# Patient Record
Sex: MALE | Race: WHITE | NOT HISPANIC OR LATINO | Employment: STUDENT | URBAN - NONMETROPOLITAN AREA
[De-identification: names, ages, dates, MRNs, and addresses within clinical notes are randomized per-mention and may not be internally consistent; named-entity substitution may affect disease eponyms.]

---

## 2019-07-18 ENCOUNTER — NON-PROVIDER VISIT (OUTPATIENT)
Dept: URGENT CARE | Facility: PHYSICIAN GROUP | Age: 19
End: 2019-07-18

## 2019-07-18 DIAGNOSIS — Z02.1 PRE-EMPLOYMENT DRUG SCREENING: ICD-10-CM

## 2019-07-18 PROCEDURE — 80305 DRUG TEST PRSMV DIR OPT OBS: CPT | Performed by: PHYSICIAN ASSISTANT

## 2019-07-27 ENCOUNTER — HOSPITAL ENCOUNTER (OUTPATIENT)
Facility: MEDICAL CENTER | Age: 19
End: 2019-07-27
Attending: PHYSICIAN ASSISTANT

## 2019-07-27 ENCOUNTER — OFFICE VISIT (OUTPATIENT)
Dept: URGENT CARE | Facility: PHYSICIAN GROUP | Age: 19
End: 2019-07-27

## 2019-07-27 VITALS
HEART RATE: 60 BPM | DIASTOLIC BLOOD PRESSURE: 66 MMHG | OXYGEN SATURATION: 96 % | WEIGHT: 168 LBS | HEIGHT: 71 IN | TEMPERATURE: 98.1 F | BODY MASS INDEX: 23.52 KG/M2 | RESPIRATION RATE: 16 BRPM | SYSTOLIC BLOOD PRESSURE: 114 MMHG

## 2019-07-27 DIAGNOSIS — Z20.2 POSSIBLE EXPOSURE TO STD: ICD-10-CM

## 2019-07-27 PROCEDURE — 87591 N.GONORRHOEAE DNA AMP PROB: CPT

## 2019-07-27 PROCEDURE — 99204 OFFICE O/P NEW MOD 45 MIN: CPT | Mod: 25 | Performed by: PHYSICIAN ASSISTANT

## 2019-07-27 PROCEDURE — 87491 CHLMYD TRACH DNA AMP PROBE: CPT

## 2019-07-27 RX ORDER — AZITHROMYCIN 250 MG/1
1000 TABLET, FILM COATED ORAL ONCE
Qty: 4 TAB | Refills: 0 | Status: SHIPPED | OUTPATIENT
Start: 2019-07-27 | End: 2019-07-27

## 2019-07-27 NOTE — PROGRESS NOTES
Chief Complaint   Patient presents with   • Exposure to STD       HISTORY OF PRESENT ILLNESS: Patient is a 19 y.o. male who presents today because he has had yellow discharge and burning with urination for the last month after having unprotected intercourse with someone that he knows to have intercourse with other people.  No fevers, chills, nausea, vomiting or diarrhea.  He has not been taking any medications for his current symptoms    There are no active problems to display for this patient.      Allergies:Patient has no known allergies.    Current Outpatient Prescriptions Ordered in [facility]   Medication Sig Dispense Refill   • azithromycin (ZITHROMAX) 250 MG Tab Take 4 Tabs by mouth Once for 1 dose. 4 Tab 0   • ciprofloxacin (CILOXIN) 0.3 % SOLN Place 1 Drop in both eyes every 4 hours. 1 Bottle 0     Current Facility-Administered Medications Ordered in Epic   Medication Dose Route Frequency Provider Last Rate Last Dose   • cefTRIAXone (ROCEPHIN) 1 g, lidocaine (XYLOCAINE) 1 % 3.6 mL for IM use  1 g Intramuscular Once Michael Lazcano, P.A.-C.           No past medical history on file.    Social History   Substance Use Topics   • Smoking status: Never Smoker   • Smokeless tobacco: Never Used   • Alcohol use No       No family status information on file.   No family history on file.    ROS:  Review of Systems   Constitutional: Negative for fever, chills, weight loss and malaise/fatigue.   HENT: Negative for ear pain, nosebleeds, congestion, sore throat and neck pain.    Eyes: Negative for blurred vision.   Respiratory: Negative for cough, sputum production, shortness of breath and wheezing.    Cardiovascular: Negative for chest pain, palpitations, orthopnea and leg swelling.   Gastrointestinal: Negative for heartburn, nausea, vomiting and abdominal pain.   Genitourinary: Positive for dysuria, urgency and frequency.     Exam:  /66 (BP Location: Left arm, Patient Position: Sitting, BP Cuff Size: Adult)   Pulse 60  "  Temp 36.7 °C (98.1 °F) (Temporal)   Resp 16   Ht 1.803 m (5' 11\")   Wt 76.2 kg (168 lb)   SpO2 96%   General:  Well nourished, well developed male in NAD  Head:Normocephalic, atraumatic  Eyes: PERRLA, EOM within normal limits, no conjunctival injection, no scleral icterus, visual fields and acuity grossly intact.  Nose: Symmetrical without tenderness, no discharge.  Mouth: reasonable hygiene, no erythema exudates or tonsillar enlargement.  Neck: no masses, range of motion within normal limits, no tracheal deviation. No obvious thyroid enlargement.  Extremities: no clubbing, cyanosis, or edema.    Please note that this dictation was created using voice recognition software. I have made every reasonable attempt to correct obvious errors, but I expect that there are errors of grammar and possibly content that I did not discover before finalizing the note.    Assessment/Plan:  1. Possible exposure to STD  CHLAMYDIA/GC PCR URINE OR SWAB    azithromycin (ZITHROMAX) 250 MG Tab    cefTRIAXone (ROCEPHIN) 1 g, lidocaine (XYLOCAINE) 1 % 3.6 mL for IM use       Followup with primary care in the next 7-10 days if not significantly improving, return to the urgent care or go to the emergency room sooner for any worsening of symptoms.       "

## 2019-07-28 LAB
C TRACH DNA SPEC QL NAA+PROBE: NEGATIVE
N GONORRHOEA DNA SPEC QL NAA+PROBE: NEGATIVE
SPECIMEN SOURCE: NORMAL

## 2025-02-22 ENCOUNTER — OFFICE VISIT (OUTPATIENT)
Dept: URGENT CARE | Facility: CLINIC | Age: 25
End: 2025-02-22
Payer: COMMERCIAL

## 2025-02-22 VITALS
TEMPERATURE: 97.3 F | WEIGHT: 196.87 LBS | HEIGHT: 72 IN | HEART RATE: 73 BPM | OXYGEN SATURATION: 96 % | DIASTOLIC BLOOD PRESSURE: 76 MMHG | BODY MASS INDEX: 26.67 KG/M2 | SYSTOLIC BLOOD PRESSURE: 122 MMHG | RESPIRATION RATE: 16 BRPM

## 2025-02-22 DIAGNOSIS — H10.9 CONJUNCTIVITIS OF LEFT EYE, UNSPECIFIED CONJUNCTIVITIS TYPE: ICD-10-CM

## 2025-02-22 PROCEDURE — 99203 OFFICE O/P NEW LOW 30 MIN: CPT | Performed by: STUDENT IN AN ORGANIZED HEALTH CARE EDUCATION/TRAINING PROGRAM

## 2025-02-22 RX ORDER — KETOROLAC TROMETHAMINE 5 MG/ML
1 SOLUTION OPHTHALMIC 4 TIMES DAILY PRN
Qty: 5 ML | Refills: 0 | Status: SHIPPED | OUTPATIENT
Start: 2025-02-22 | End: 2025-03-01

## 2025-02-22 RX ORDER — AZELASTINE HYDROCHLORIDE 0.5 MG/ML
1 SOLUTION/ DROPS OPHTHALMIC 2 TIMES DAILY
Qty: 6 ML | Refills: 0 | Status: SHIPPED | OUTPATIENT
Start: 2025-02-22 | End: 2025-03-01

## 2025-02-22 NOTE — LETTER
February 22, 2025    To Whom It May Concern:         This is confirmation that Blayne Be attended his scheduled appointment with Sergio Slade P.A.-C. on 2/22/25.  Excuse from work on 2/22/2025.         If you have any questions please do not hesitate to call me at the phone number listed below.    Sincerely,          Sergio Slade P.A.-C.  [phone]

## 2025-02-23 NOTE — PROGRESS NOTES
Subjective:   Blayne Be is a 24 y.o. male who presents for Eye Problem (X1day. L eye red, dry. No itchiness, impacting vision)      HPI:  24-year-old male presents to the urgent care for 24 hours of left eye redness, discomfort, dryness, and grittiness.  Does have some sensitivity to light as well.  No blurred vision or double vision.  No black spots within his vision or loss of vision.  Denies any traumatic injury or sensation of foreign body within the eye.  No fever, dizziness, or headache.    Medications:    azelastine  ciprofloxacin Soln  ketorolac Soln    Allergies: Patient has no known allergies.    Problem List: Blayne Be does not have a problem list on file.    Surgical History:  No past surgical history on file.    Past Social Hx: Blayne Be  reports that he has never smoked. He has never used smokeless tobacco. He reports current drug use. Drugs: Marijuana and Inhaled. He reports that he does not drink alcohol.     Past Family Hx:  Blayne Be family history is not on file.     Problem list, medications, and allergies reviewed by myself today in Epic.     Objective:     /76   Pulse 73   Temp 36.3 °C (97.3 °F)   Resp 16   Ht 1.829 m (6')   Wt 89.3 kg (196 lb 13.9 oz)   SpO2 96%   BMI 26.70 kg/m²     Physical Exam  Eyes:      General: Lids are everted, no foreign bodies appreciated.         Right eye: No foreign body, discharge or hordeolum.         Left eye: No foreign body, discharge or hordeolum.      Extraocular Movements: Extraocular movements intact.      Right eye: Normal extraocular motion.      Left eye: Normal extraocular motion.      Conjunctiva/sclera:      Right eye: Right conjunctiva is not injected. No chemosis.     Left eye: Left conjunctiva is injected. Chemosis present. No hemorrhage.        Assessment/Plan:     Diagnosis and associated orders:     1. Conjunctivitis of left eye, unspecified conjunctivitis type  azelastine (OPTIVAR) 0.05 % ophthalmic  solution    ketorolac (ACULAR) 0.5 % Solution         Comments/MDM:     Patient's presentation physical exam findings are consistent with conjunctivitis of the left eye.  Likely viral versus inflammatory.  I do not suspect any acute angle glaucoma.  Findings today not consistent with uveitis.  No rash to the face.  Presentation not consistent with preseptal orbital cellulitis.  Patient was started on azelastine eyedrops and ketorolac eyedrops.  Discussed reevaluation the emergency department if he starts having any pain with eye movements, change in vision, or loss of vision.         Differential diagnosis, natural history, supportive care, and indications for immediate follow-up discussed.    Advised the patient to follow-up with the primary care physician for recheck, reevaluation, and consideration of further management.    Please note that this dictation was created using voice recognition software. I have made a reasonable attempt to correct obvious errors, but I expect that there are errors of grammar and possibly content that I did not discover before finalizing the note.    Electronically signed by Sergio Slade PA-C.

## 2025-04-28 ENCOUNTER — HOSPITAL ENCOUNTER (OUTPATIENT)
Dept: LAB | Facility: MEDICAL CENTER | Age: 25
End: 2025-04-28
Payer: COMMERCIAL

## 2025-04-28 ENCOUNTER — OFFICE VISIT (OUTPATIENT)
Dept: URGENT CARE | Facility: CLINIC | Age: 25
End: 2025-04-28
Payer: COMMERCIAL

## 2025-04-28 ENCOUNTER — RESULTS FOLLOW-UP (OUTPATIENT)
Dept: URGENT CARE | Facility: CLINIC | Age: 25
End: 2025-04-28

## 2025-04-28 VITALS
RESPIRATION RATE: 12 BRPM | OXYGEN SATURATION: 97 % | DIASTOLIC BLOOD PRESSURE: 78 MMHG | TEMPERATURE: 98.1 F | HEART RATE: 76 BPM | BODY MASS INDEX: 25.06 KG/M2 | HEIGHT: 72 IN | SYSTOLIC BLOOD PRESSURE: 124 MMHG | WEIGHT: 185 LBS

## 2025-04-28 DIAGNOSIS — R30.0 DYSURIA: ICD-10-CM

## 2025-04-28 DIAGNOSIS — R30.0 DYSURIA: Primary | ICD-10-CM

## 2025-04-28 DIAGNOSIS — Z11.3 SCREENING EXAMINATION FOR STI: ICD-10-CM

## 2025-04-28 LAB
APPEARANCE UR: CLEAR
BILIRUB UR STRIP-MCNC: NEGATIVE MG/DL
COLOR UR AUTO: YELLOW
GLUCOSE UR STRIP.AUTO-MCNC: NEGATIVE MG/DL
HBV CORE AB SERPL QL IA: NONREACTIVE
HBV SURFACE AB SERPL IA-ACNC: 181 MIU/ML (ref 0–10)
HBV SURFACE AG SER QL: NORMAL
HCV AB SER QL: NORMAL
HIV 1+2 AB+HIV1 P24 AG SERPL QL IA: NORMAL
KETONES UR STRIP.AUTO-MCNC: NEGATIVE MG/DL
LEUKOCYTE ESTERASE UR QL STRIP.AUTO: NEGATIVE
NITRITE UR QL STRIP.AUTO: NEGATIVE
PH UR STRIP.AUTO: 5.5 [PH] (ref 5–8)
PROT UR QL STRIP: NEGATIVE MG/DL
RBC UR QL AUTO: NEGATIVE
SP GR UR STRIP.AUTO: 1.01
T PALLIDUM AB SER QL IA: NORMAL
UROBILINOGEN UR STRIP-MCNC: NORMAL MG/DL

## 2025-04-28 PROCEDURE — 86706 HEP B SURFACE ANTIBODY: CPT

## 2025-04-28 PROCEDURE — 86803 HEPATITIS C AB TEST: CPT

## 2025-04-28 PROCEDURE — 87340 HEPATITIS B SURFACE AG IA: CPT

## 2025-04-28 PROCEDURE — 36415 COLL VENOUS BLD VENIPUNCTURE: CPT

## 2025-04-28 PROCEDURE — 86780 TREPONEMA PALLIDUM: CPT

## 2025-04-28 PROCEDURE — 87389 HIV-1 AG W/HIV-1&-2 AB AG IA: CPT

## 2025-04-28 PROCEDURE — 86704 HEP B CORE ANTIBODY TOTAL: CPT

## 2025-04-28 ASSESSMENT — ENCOUNTER SYMPTOMS
WEAKNESS: 0
ABDOMINAL PAIN: 0
FEVER: 0
DIZZINESS: 0
NAUSEA: 0
FATIGUE: 1
SHORTNESS OF BREATH: 0
COUGH: 0
DIARRHEA: 0
CHILLS: 0
VOMITING: 0

## 2025-04-28 NOTE — PROGRESS NOTES
"Subjective     Blayne Be is a 24 y.o. male who presents with Sexually Transmitted Diseases (Testing, since yesterday, discharge, burning , rash.)            Blayne is here today wanting complete STI testing.  He notes that he has a \"bump\" above his genitals and noticed some abnormal discharge and pain with urination this morning.  He notes that he was recently at [place] with his partner.  He denies any new sexual partners but is unsure about his partners status.  He notes he had a gonorrhea and chlamydia test this morning which was negative however they did not have syphilis tests currently at the clinic that he went to therefore he presented to [facility].    Sexually Transmitted Diseases  This is a new problem. The current episode started 1 to 4 weeks ago. The problem has been waxing and waning. Associated symptoms include fatigue. Pertinent negatives include no abdominal pain, chest pain, chills, congestion, coughing, fever, nausea, rash, vomiting or weakness.       Review of Systems   Constitutional:  Positive for fatigue and malaise/fatigue. Negative for chills and fever.   HENT:  Negative for congestion.    Respiratory:  Negative for cough and shortness of breath.    Cardiovascular:  Negative for chest pain.   Gastrointestinal:  Negative for abdominal pain, diarrhea, nausea and vomiting.   Skin:  Negative for rash.   Neurological:  Negative for dizziness and weakness.   All other systems reviewed and are negative.             Objective     /78 (BP Location: Left arm, Patient Position: Sitting, BP Cuff Size: Adult)   Pulse 76   Temp 36.7 °C (98.1 °F) (Temporal)   Resp 12   Ht 1.829 m (6')   Wt 83.9 kg (185 lb)   SpO2 97%   BMI 25.09 kg/m²      Physical Exam  Vitals reviewed.   Constitutional:       Appearance: Normal appearance.   HENT:      Head: Normocephalic and atraumatic.      Nose: Nose normal.   Eyes:      Conjunctiva/sclera: Conjunctivae normal.   Cardiovascular:      Rate and Rhythm: " Normal rate.   Pulmonary:      Effort: Pulmonary effort is normal.   Genitourinary:         Comments: ~ 5mm rasied red bump  Musculoskeletal:         General: Normal range of motion.   Skin:     General: Skin is warm and dry.   Neurological:      Mental Status: He is alert and oriented to person, place, and time.   Psychiatric:         Behavior: Behavior normal.         Judgment: Judgment normal.                                  Assessment & Plan  Dysuria    Orders:    POCT Urinalysis    Screening examination for STI    Orders:    HIV AG/AB Combo Assay Screening; Future    T.Pallidum AB AMY (Screening); Future    Hepatitis C Virus Antibody; Future    HEP B Surface Antibody; Future    Hep B Core AB Total; Future    Hep B Surface Antigen; Future       Results for orders placed or performed in visit on 04/28/25   POCT Urinalysis    Collection Time: 04/28/25 11:40 AM   Result Value Ref Range    POC Color yellow Negative    POC Appearance clear Negative    POC Glucose negative Negative mg/dL    POC Bilirubin negative Negative mg/dL    POC Ketones negative Negative mg/dL    POC Specific Gravity 1.010 <1.005 - >1.030    POC Blood negative Negative    POC Urine PH 5.5 5.0 - 8.0    POC Protein negative Negative mg/dL    POC Urobiligen 0.2 E. U. /dL Negative (0.2) mg/dL    POC Nitrites negative Negative    POC Leukocyte Esterase negative Negative       Updated Blayne that his urinalysis was negative for any urinary tract infection.  Orders placed for lab work and will update via 1006.[id number] with the results.  Discussed that given the appearance of the bump in his groin and ingrown hair versus abscess.  Encouraged warm water compress to the area.    Shared decision-making was utilized with Blayne for plan of care. Discussed differential diagnoses, natural history, and supportive care. Reviewed indication for use and the potential benefits and side effects of medications. Blayne was encouraged to monitor symptoms closely and educated  about signs and symptoms that would warrant concern and mandate seeking a higher level of service through the emergency department discussed at length. All questions answered to Blayne's satisfaction and they were in agreement with treatment plan at time of discharge.

## 2025-05-01 ENCOUNTER — HOSPITAL ENCOUNTER (INPATIENT)
Facility: MEDICAL CENTER | Age: 25
LOS: 2 days | DRG: 287 | End: 2025-05-03
Attending: INTERNAL MEDICINE | Admitting: GENERAL PRACTICE
Payer: COMMERCIAL

## 2025-05-01 DIAGNOSIS — I21.4 NSTEMI (NON-ST ELEVATED MYOCARDIAL INFARCTION) (HCC): ICD-10-CM

## 2025-05-01 DIAGNOSIS — I31.9 PERICARDITIS, UNSPECIFIED CHRONICITY, UNSPECIFIED TYPE: ICD-10-CM

## 2025-05-01 PROBLEM — I44.7 LBBB (LEFT BUNDLE BRANCH BLOCK): Status: ACTIVE | Noted: 2025-05-01

## 2025-05-01 PROBLEM — I44.7 LBBB (LEFT BUNDLE BRANCH BLOCK): Status: RESOLVED | Noted: 2025-05-01 | Resolved: 2025-05-01

## 2025-05-01 PROBLEM — F19.10 POLYSUBSTANCE ABUSE (HCC): Status: ACTIVE | Noted: 2025-05-01

## 2025-05-01 PROBLEM — R79.89 ELEVATED TROPONIN: Status: ACTIVE | Noted: 2025-05-01

## 2025-05-01 PROBLEM — I51.4 MYOCARDITIS (HCC): Status: ACTIVE | Noted: 2025-05-01

## 2025-05-01 LAB
AMPHET UR QL SCN: NEGATIVE
BARBITURATES UR QL SCN: NEGATIVE
BENZODIAZ UR QL SCN: NEGATIVE
BZE UR QL SCN: NEGATIVE
CANNABINOIDS UR QL SCN: POSITIVE
CRP SERPL HS-MCNC: 40.3 MG/L (ref 0–3)
ERYTHROCYTE [SEDIMENTATION RATE] IN BLOOD BY WESTERGREN METHOD: 16 MM/HOUR (ref 0–20)
FENTANYL UR QL: NEGATIVE
HAV IGM SERPL QL IA: NORMAL
HBV CORE IGM SER QL: NORMAL
HBV SURFACE AG SER QL: NORMAL
HCV AB SER QL: NORMAL
HIV 1+2 AB+HIV1 P24 AG SERPL QL IA: NORMAL
METHADONE UR QL SCN: NEGATIVE
OPIATES UR QL SCN: NEGATIVE
OXYCODONE UR QL SCN: NEGATIVE
PCP UR QL SCN: NEGATIVE
PROPOXYPH UR QL SCN: NEGATIVE
TROPONIN T SERPL-MCNC: 706 NG/L (ref 6–19)
TROPONIN T SERPL-MCNC: 732 NG/L (ref 6–19)

## 2025-05-01 PROCEDURE — 87389 HIV-1 AG W/HIV-1&-2 AB AG IA: CPT

## 2025-05-01 PROCEDURE — 770020 HCHG ROOM/CARE - TELE (206)

## 2025-05-01 PROCEDURE — 80074 ACUTE HEPATITIS PANEL: CPT

## 2025-05-01 PROCEDURE — 99223 1ST HOSP IP/OBS HIGH 75: CPT | Performed by: GENERAL PRACTICE

## 2025-05-01 PROCEDURE — 80307 DRUG TEST PRSMV CHEM ANLYZR: CPT

## 2025-05-01 PROCEDURE — 99222 1ST HOSP IP/OBS MODERATE 55: CPT | Performed by: INTERNAL MEDICINE

## 2025-05-01 PROCEDURE — 93005 ELECTROCARDIOGRAM TRACING: CPT | Mod: TC | Performed by: GENERAL PRACTICE

## 2025-05-01 PROCEDURE — 84484 ASSAY OF TROPONIN QUANT: CPT | Mod: 91

## 2025-05-01 PROCEDURE — 700102 HCHG RX REV CODE 250 W/ 637 OVERRIDE(OP): Performed by: GENERAL PRACTICE

## 2025-05-01 PROCEDURE — A9270 NON-COVERED ITEM OR SERVICE: HCPCS | Performed by: INTERNAL MEDICINE

## 2025-05-01 PROCEDURE — 700102 HCHG RX REV CODE 250 W/ 637 OVERRIDE(OP): Performed by: INTERNAL MEDICINE

## 2025-05-01 PROCEDURE — 36415 COLL VENOUS BLD VENIPUNCTURE: CPT

## 2025-05-01 PROCEDURE — 85652 RBC SED RATE AUTOMATED: CPT

## 2025-05-01 PROCEDURE — 86141 C-REACTIVE PROTEIN HS: CPT

## 2025-05-01 PROCEDURE — A9270 NON-COVERED ITEM OR SERVICE: HCPCS | Performed by: GENERAL PRACTICE

## 2025-05-01 RX ORDER — OMEPRAZOLE 20 MG/1
20 CAPSULE, DELAYED RELEASE ORAL 2 TIMES DAILY
Status: DISCONTINUED | OUTPATIENT
Start: 2025-05-01 | End: 2025-05-03 | Stop reason: HOSPADM

## 2025-05-01 RX ORDER — OXYCODONE HYDROCHLORIDE 10 MG/1
10 TABLET ORAL
Refills: 0 | Status: DISCONTINUED | OUTPATIENT
Start: 2025-05-01 | End: 2025-05-03 | Stop reason: HOSPADM

## 2025-05-01 RX ORDER — HYDROMORPHONE HYDROCHLORIDE 1 MG/ML
0.5 INJECTION, SOLUTION INTRAMUSCULAR; INTRAVENOUS; SUBCUTANEOUS
Status: DISCONTINUED | OUTPATIENT
Start: 2025-05-01 | End: 2025-05-01

## 2025-05-01 RX ORDER — OXYCODONE HYDROCHLORIDE 5 MG/1
5 TABLET ORAL
Refills: 0 | Status: DISCONTINUED | OUTPATIENT
Start: 2025-05-01 | End: 2025-05-03 | Stop reason: HOSPADM

## 2025-05-01 RX ORDER — IBUPROFEN 800 MG/1
800 TABLET, FILM COATED ORAL 3 TIMES DAILY
Status: DISCONTINUED | OUTPATIENT
Start: 2025-05-01 | End: 2025-05-03

## 2025-05-01 RX ORDER — PROMETHAZINE HYDROCHLORIDE 12.5 MG/1
12.5-25 SUPPOSITORY RECTAL EVERY 4 HOURS PRN
Status: DISCONTINUED | OUTPATIENT
Start: 2025-05-01 | End: 2025-05-03 | Stop reason: HOSPADM

## 2025-05-01 RX ORDER — ONDANSETRON 2 MG/ML
4 INJECTION INTRAMUSCULAR; INTRAVENOUS EVERY 4 HOURS PRN
Status: DISCONTINUED | OUTPATIENT
Start: 2025-05-01 | End: 2025-05-03 | Stop reason: HOSPADM

## 2025-05-01 RX ORDER — ONDANSETRON 4 MG/1
4 TABLET, ORALLY DISINTEGRATING ORAL EVERY 4 HOURS PRN
Status: DISCONTINUED | OUTPATIENT
Start: 2025-05-01 | End: 2025-05-03 | Stop reason: HOSPADM

## 2025-05-01 RX ORDER — ENOXAPARIN SODIUM 100 MG/ML
40 INJECTION SUBCUTANEOUS DAILY
Status: DISCONTINUED | OUTPATIENT
Start: 2025-05-02 | End: 2025-05-03 | Stop reason: HOSPADM

## 2025-05-01 RX ORDER — IBUPROFEN 600 MG/1
600 TABLET, FILM COATED ORAL 3 TIMES DAILY
Status: DISCONTINUED | OUTPATIENT
Start: 2025-05-01 | End: 2025-05-01

## 2025-05-01 RX ORDER — KETOROLAC TROMETHAMINE 15 MG/ML
15 INJECTION, SOLUTION INTRAMUSCULAR; INTRAVENOUS EVERY 6 HOURS PRN
Status: DISCONTINUED | OUTPATIENT
Start: 2025-05-01 | End: 2025-05-03 | Stop reason: HOSPADM

## 2025-05-01 RX ORDER — ACETAMINOPHEN 325 MG/1
650 TABLET ORAL EVERY 6 HOURS PRN
Status: DISCONTINUED | OUTPATIENT
Start: 2025-05-01 | End: 2025-05-03 | Stop reason: HOSPADM

## 2025-05-01 RX ORDER — COLCHICINE 0.6 MG/1
0.6 TABLET ORAL 2 TIMES DAILY
Status: DISCONTINUED | OUTPATIENT
Start: 2025-05-01 | End: 2025-05-03

## 2025-05-01 RX ORDER — PROCHLORPERAZINE EDISYLATE 5 MG/ML
5-10 INJECTION INTRAMUSCULAR; INTRAVENOUS EVERY 4 HOURS PRN
Status: DISCONTINUED | OUTPATIENT
Start: 2025-05-01 | End: 2025-05-03 | Stop reason: HOSPADM

## 2025-05-01 RX ORDER — PROMETHAZINE HYDROCHLORIDE 25 MG/1
12.5-25 TABLET ORAL EVERY 4 HOURS PRN
Status: DISCONTINUED | OUTPATIENT
Start: 2025-05-01 | End: 2025-05-03 | Stop reason: HOSPADM

## 2025-05-01 RX ADMIN — COLCHICINE 0.6 MG: 0.6 TABLET, FILM COATED ORAL at 18:05

## 2025-05-01 RX ADMIN — IBUPROFEN 800 MG: 800 TABLET, FILM COATED ORAL at 21:55

## 2025-05-01 RX ADMIN — OMEPRAZOLE 20 MG: 20 CAPSULE, DELAYED RELEASE ORAL at 18:05

## 2025-05-01 SDOH — ECONOMIC STABILITY: TRANSPORTATION INSECURITY
IN THE PAST 12 MONTHS, HAS THE LACK OF TRANSPORTATION KEPT YOU FROM MEDICAL APPOINTMENTS OR FROM GETTING MEDICATIONS?: NO

## 2025-05-01 SDOH — ECONOMIC STABILITY: TRANSPORTATION INSECURITY
IN THE PAST 12 MONTHS, HAS LACK OF RELIABLE TRANSPORTATION KEPT YOU FROM MEDICAL APPOINTMENTS, MEETINGS, WORK OR FROM GETTING THINGS NEEDED FOR DAILY LIVING?: NO

## 2025-05-01 ASSESSMENT — LIFESTYLE VARIABLES
DOES PATIENT WANT TO STOP DRINKING: NO
TOTAL SCORE: 0
TOTAL SCORE: 0
ON A TYPICAL DAY WHEN YOU DRINK ALCOHOL HOW MANY DRINKS DO YOU HAVE: 0
CONSUMPTION TOTAL: NEGATIVE
HOW MANY TIMES IN THE PAST YEAR HAVE YOU HAD 5 OR MORE DRINKS IN A DAY: 0
AVERAGE NUMBER OF DAYS PER WEEK YOU HAVE A DRINK CONTAINING ALCOHOL: 0
TOTAL SCORE: 0
HAVE YOU EVER FELT YOU SHOULD CUT DOWN ON YOUR DRINKING: NO
HAVE PEOPLE ANNOYED YOU BY CRITICIZING YOUR DRINKING: NO
ALCOHOL_USE: NO
EVER FELT BAD OR GUILTY ABOUT YOUR DRINKING: NO
EVER HAD A DRINK FIRST THING IN THE MORNING TO STEADY YOUR NERVES TO GET RID OF A HANGOVER: NO

## 2025-05-01 ASSESSMENT — SOCIAL DETERMINANTS OF HEALTH (SDOH)
WITHIN THE PAST 12 MONTHS, YOU WORRIED THAT YOUR FOOD WOULD RUN OUT BEFORE YOU GOT THE MONEY TO BUY MORE: NEVER TRUE
WITHIN THE LAST YEAR, HAVE YOU BEEN KICKED, HIT, SLAPPED, OR OTHERWISE PHYSICALLY HURT BY YOUR PARTNER OR EX-PARTNER?: NO
WITHIN THE LAST YEAR, HAVE YOU BEEN AFRAID OF YOUR PARTNER OR EX-PARTNER?: NO
WITHIN THE LAST YEAR, HAVE YOU BEEN HUMILIATED OR EMOTIONALLY ABUSED IN OTHER WAYS BY YOUR PARTNER OR EX-PARTNER?: NO
WITHIN THE LAST YEAR, HAVE TO BEEN RAPED OR FORCED TO HAVE ANY KIND OF SEXUAL ACTIVITY BY YOUR PARTNER OR EX-PARTNER?: NO
IN THE PAST 12 MONTHS, HAS THE ELECTRIC, GAS, OIL, OR WATER COMPANY THREATENED TO SHUT OFF SERVICE IN YOUR HOME?: NO
WITHIN THE PAST 12 MONTHS, THE FOOD YOU BOUGHT JUST DIDN'T LAST AND YOU DIDN'T HAVE MONEY TO GET MORE: NEVER TRUE

## 2025-05-01 ASSESSMENT — COGNITIVE AND FUNCTIONAL STATUS - GENERAL
DAILY ACTIVITIY SCORE: 24
SUGGESTED CMS G CODE MODIFIER DAILY ACTIVITY: CH
SUGGESTED CMS G CODE MODIFIER MOBILITY: CH
MOBILITY SCORE: 24

## 2025-05-01 ASSESSMENT — ENCOUNTER SYMPTOMS
FEVER: 0
DIAPHORESIS: 0
WHEEZING: 0
HEMATOCHEZIA: 0
COUGH: 0
HEMOPTYSIS: 0

## 2025-05-01 ASSESSMENT — PATIENT HEALTH QUESTIONNAIRE - PHQ9
1. LITTLE INTEREST OR PLEASURE IN DOING THINGS: NOT AT ALL
SUM OF ALL RESPONSES TO PHQ9 QUESTIONS 1 AND 2: 0
2. FEELING DOWN, DEPRESSED, IRRITABLE, OR HOPELESS: NOT AT ALL

## 2025-05-01 NOTE — ASSESSMENT & PLAN NOTE
Patient reported using possibly cocaine and poppers at festival about 2 weeks ago  Counseled at length in regards to cessation

## 2025-05-01 NOTE — PROGRESS NOTES
Report received from [facility] RN, assumed patient care. Patient is calmly resting in bed, no signs of distress, even and unlabored breathing noted. Pt on room air. A&Ox4. Tele box on and in place, confirmed with tele tech. Patient has call light within reach, fall precautions in place. Patient states no needs at this time. Hourly rounding initiated.

## 2025-05-01 NOTE — ASSESSMENT & PLAN NOTE
Concern for?  ESR, CRP  Troponin 434 at outside facility  EKG @ OSH with no acute ischemia, noted new LBBB  Repeat EKG pending  ECHO ordered  Cardiology consulted

## 2025-05-01 NOTE — PROGRESS NOTES
[facility] DIRECT ADMISSION REPORT  Transferring facility: Dr Esparza  Transferring physician: Dr Banner Glez    Chief complaint: CP and SOB  Pertinent history & patient course:     24-year-old male with no significant medical history presented to the outside hospital with chest pain, shortness of breath and possible fevers.  His chest x-ray was unremarkable.  He did find have a white blood cell count of 10-11,000.  His EKG initially showed possible new onset left bundle branch block.  His troponin was also 400.  He is hemodynamically stable.  They gave him Toradol for pain and a repeat EKG showed narrowing of the QRS complex.  He will be transferred here for cardiology consultation possible NSTEMI versus myopericarditis.  There is no family history of heart disease.    Pertinent imaging & lab results: See above  Consultants called prior to transfer and pertinent input from consultants: None  Code Status: Full code full care per transferring provider, I personally verified with the transferring provider patient's code status and the transferring provider has confirmed this with the patient.  Reason for Transfer: Cardiology consultation  Further work up or recommendations requested prior to transfer: None    Patient accepted for transfer: Yes  Accepting [facility] Facility: [facility] Nursing to notify the Triage Coordinator in the RTOC via Voalte or Phone ext. [phone] when patient arrives to the unit. The Triage Coordinator will assign the admitting provider.    Consultants to be called upon arrival: Cardiology  Admission status: Inpatient.   Floor requested: Telemetry  If ICU transfer, name of intensivist case discussed with and pertinent input from critical care: Not applicable    The admitting provider is the point of contact for questions or concerns regarding patient's care.

## 2025-05-01 NOTE — CONSULTS
Cardiology Initial Consult Note    Date of note:    5/1/2025      Consulting Physician: Elvis Burleson M.D.    Name:   Blayne Be   YOB: 2000  Age:   24 y.o.  male   MRN:   [id number]    Assessment/Recommendations:   Chest pain with trop 706 here likely viral myocpericarditis.  Will see what hsCRP shows.  Pain did improve with toradal.  Unlikely acute coronary syndrome from plaque rupture in a 24 year old without CAD risk factors.  I did discuss cocaine can cause coronary vasospasm and MI and strokes but seems out of the time frame.  Discussed no further cocaine even if at event as can be very harmful to his health.  Trend trop  Ibuprofen 800 mg po tid x 1 week then 800 mg po bid x 7 days then 400 mg po bid x 14 days  Colchicine 0.6 mg po bid x 3 months  Echo    Disposition: 1 day for echo and medications    Reason for Consultation: chest pain, troponin elevation    HPI:  Blayne Be is a 24 y.o. male man without significant past medical history, presented to outside hospital with chest pain.  Patient was transferred to [facility] for elevated troponin further management.     He last felt like his normal self April 21, then he started noticing fevers, body aches, congestions thought he might have had a cold.  Staying about the same.  Yesterday he left work and took a hit of his vaping and felt pain with breathing and then started having chest pain.  It was intermittent off and on, comes few seconds and then goes away.      He had been to [place] 4/20.  He did do cocaine there, but none since and he normally does not do any drugs, no meth.  He has only used cocaine about 6 x in his lifetime.     He has used cocaine about 6 x in his life.  No meth.  He vapes.  He smokes weed.    No family history of premature CAD.    No sudden death.   No major childhood illness.      Review of outside records:  ECG showed diffuse ST elevation, peaked T waves  Glucose 142, Cr 0.92, AD 53, proBNP 110  Trop  434  Lactic acid 1.2  WBC 14.2  Hgb 13.6, plt 307    Cardiac Imaging and Procedures Review (personally reviewed and notable for):    EKG dated 5/1/25:  sinus, early transition, borderline ST elevation can diffuse, possible pericarditis, no prior ECG    Radiology test Review:  EC-ECHOCARDIOGRAM COMPLETE W/O CONT    (Results Pending)       Physical Exam  Body mass index is 24.61 kg/m².  /65   Pulse 61   Temp 36.7 °C (98.1 °F) (Temporal)   Resp 17   Wt 82.3 kg (181 lb 7 oz)   SpO2 96%   Vitals:    05/01/25 1417   BP: 118/65   Pulse: 61   Resp: 17   Temp: 36.7 °C (98.1 °F)   TempSrc: Temporal   SpO2: 96%   Weight: 82.3 kg (181 lb 7 oz)     Oxygen Therapy:  Pulse Oximetry: 96 %, O2 (LPM): 0, O2 Delivery Device: None - Room Air    Physical Exam  Constitutional:       Appearance: Normal appearance.   Eyes:      Extraocular Movements: Extraocular movements intact.      Conjunctiva/sclera: Conjunctivae normal.   Neck:      Vascular: No carotid bruit or JVD.   Cardiovascular:      Rate and Rhythm: Normal rate and regular rhythm.      Pulses:           Radial pulses are 1+ on the right side and 1+ on the left side.        Posterior tibial pulses are 1+ on the right side and 1+ on the left side.      Heart sounds: Normal heart sounds. No murmur heard.     No friction rub. No gallop.   Pulmonary:      Effort: Pulmonary effort is normal. No respiratory distress.      Breath sounds: Normal breath sounds. No wheezing or rales.   Abdominal:      General: Bowel sounds are normal.      Palpations: Abdomen is soft.   Musculoskeletal:      Cervical back: Neck supple.      Right lower leg: No edema.      Left lower leg: No edema.   Skin:     General: Skin is warm and dry.   Neurological:      Mental Status: He is alert and oriented to person, place, and time. Mental status is at baseline.   Psychiatric:         Mood and Affect: Mood normal.          Problem list:  Principal Problem:    NSTEMI (non-ST elevated myocardial  infarction) (HCC) (POA: Yes)  Active Problems:    Pericarditis (POA: Unknown)    LBBB (left bundle branch block) (POA: Unknown)    Polysubstance abuse (HCC) (POA: Unknown)  Resolved Problems:    * No resolved hospital problems. *      History reviewed. No pertinent past medical history.    History reviewed. No pertinent surgical history.    Medications Prior to Admission   Medication Sig Dispense Refill Last Dose/Taking    ciprofloxacin (CILOXIN) 0.3 % SOLN Place 1 Drop in both eyes every 4 hours. (Patient not taking: Reported on 2/22/2025) 1 Bottle 0      Current Facility-Administered Medications   Medication Dose Route Frequency Provider Last Rate Last Admin    acetaminophen (Tylenol) tablet 650 mg  650 mg Oral Q6HRS PRN JEM Luo.O.        ondansetron (Zofran) syringe/vial injection 4 mg  4 mg Intravenous Q4HRS PRN JEM Luo.O.        ondansetron (Zofran ODT) dispertab 4 mg  4 mg Oral Q4HRS PRN JEM Luo.O.        promethazine (Phenergan) tablet 12.5-25 mg  12.5-25 mg Oral Q4HRS PRN Devorah Hill D.O.        promethazine (Phenergan) suppository 12.5-25 mg  12.5-25 mg Rectal Q4HRS PRN Devorah Hill, D.O.        prochlorperazine (Compazine) injection 5-10 mg  5-10 mg Intravenous Q4HRS PRN Devorah Hill D.O.        Pharmacy Consult Request ...Pain Management Review 1 Each  1 Each Other PHARMACY TO DOSE Devorah Sergio D.O.        oxyCODONE immediate-release (Roxicodone) tablet 5 mg  5 mg Oral Q3HRS PRN Devorah Hill D.O.        Or    oxyCODONE immediate release (Roxicodone) tablet 10 mg  10 mg Oral Q3HRS PRN Devorah Hill D.O.        Or    HYDROmorphone (Dilaudid) injection 0.5 mg  0.5 mg Intravenous Q3HRS PRN Devorah Hill D.O.        [START ON 5/2/2025] enoxaparin (Lovenox) inj 40 mg  40 mg Subcutaneous DAILY AT 1800 Devorah Hill D.O.           No Known Allergies    Family History   Family history unknown: Yes       Social History     Socioeconomic History    Marital status:  "Single     Spouse name: Not on file    Number of children: Not on file    Years of education: Not on file    Highest education level: Not on file   Occupational History    Not on file   Tobacco Use    Smoking status: Never    Smokeless tobacco: Never   Vaping Use    Vaping status: Never Used   Substance and Sexual Activity    Alcohol use: No    Drug use: Yes     Types: Marijuana, Inhaled    Sexual activity: Not on file   Other Topics Concern    Behavioral problems Not Asked    Interpersonal relationships Not Asked    Sad or not enjoying activities Not Asked    Suicidal thoughts Not Asked    Poor school performance Not Asked    Reading difficulties Not Asked    Speech difficulties Not Asked    Writing difficulties Not Asked    Inadequate sleep Not Asked    Excessive TV viewing Not Asked    Excessive video game use Not Asked    Inadequate exercise Not Asked    Sports related Not Asked    Poor diet Not Asked    Family concerns for drug/alcohol abuse Not Asked    Poor oral hygiene Not Asked    Bike safety Not Asked    Family concerns vehicle safety Not Asked   Social History Narrative    Not on file     Social Drivers of Health     Financial Resource Strain: Not on file   Food Insecurity: Not on file   Transportation Needs: Not on file   Physical Activity: Not on file   Stress: Not on file   Social Connections: Not on file   Intimate Partner Violence: Not on file   Housing Stability: Not on file       No intake or output data in the 24 hours ending 05/01/25 1540      Review of Systems   Constitutional: Negative for diaphoresis and fever.   Respiratory:  Negative for cough, hemoptysis and wheezing.    Gastrointestinal:  Negative for hematochezia and melena.   Genitourinary:  Negative for hematuria.        Labs (personally reviewed and notable for):   No results found for: \"SODIUM\", \"POTASSIUM\", \"CHLORIDE\", \"CO2\", \"GLUCOSE\", \"BUN\", \"CREATININE\", \"BUNCREATRAT\", \"GLOMRATE\"   No results found for: \"WBC\", \"RBC\", \"HEMOGLOBIN\", " "\"HEMATOCRIT\", \"MCV\", \"MCH\", \"MCHC\", \"MPV\", \"NEUTSPOLYS\", \"LYMPHOCYTES\", \"MONOCYTES\", \"EOSINOPHILS\", \"BASOPHILS\", \"HYPOCHROMIA\", \"ANISOCYTOSIS\", \"INR\"   No results found for: \"CHOLSTRLTOT\", \"LDL\", \"HDL\", \"TRIGLYCERIDE\"    No results found for: \"TROPONINT\"  No results found for: \"NTPROBNP\"  No results found for: \"HBA1C\"      Thank you for allowing me to participate in the care of this patient.  Please contact me with any questions.    Sofia Vera M.D.  Cardiologist, [facility] [place]     This note was generated using voice recognition software which has a small chance of producing errors of grammar and possibly content. I have made every reasonable attempt to find and correct any obvious errors, but expect that some may not be found prior to finalization of this note.   "

## 2025-05-01 NOTE — H&P
"Hospital Medicine History & Physical Note    Date of Service  5/1/2025    Primary Care Physician  Pcp Pt States None    Consultants  cardiology    Specialist Names: Dr. Vera    Code Status  Full Code    Chief Complaint  No chief complaint on file.      History of Presenting Illness  This is a 24-year-old male who was transferred from outside facility on 05/1 due to chest pain, shortness of breath.    Patient reports that he attended [place] about 2 weeks ago, at that event he did participate in use of \"poppers\" and possibly cocaine.  Over the past 24 hours he has been having intermittent shortness of breath and chest pain, noted worse on exertion and with deep inspiration.    At outside facility chest x-ray unremarkable, EKG noted possible new onset left bundle branch block, troponin was 434.  Patient reports he felt some relief with Toradol however the pain has returned.    At our facility here, ESR, CRP, repeat troponin pending.  Echocardiogram pending.  Ordered repeat EKG with no ischemia, no LBBB.  Cardiology consulted    I discussed the plan of care with patient and bedside RN.    Review of Systems  Review of Systems   Cardiovascular:  Positive for chest pain.   All other systems reviewed and are negative.      Past Medical History   has no past medical history on file.    Surgical History   has no past surgical history on file.     Family History  Family history is unknown by patient.   Family history reviewed with patient. There is no family history that is pertinent to the chief complaint.     Social History   reports that he has never smoked. He has never used smokeless tobacco. He reports current drug use. Drugs: Marijuana and Inhaled. He reports that he does not drink alcohol.    Allergies  No Known Allergies    Medications  Prior to Admission Medications   Prescriptions Last Dose Informant Patient Reported? Taking?   ciprofloxacin (CILOXIN) 0.3 % SOLN   No No   Sig: Place 1 Drop in both eyes every 4 " "hours.   Patient not taking: Reported on 2/22/2025      Facility-Administered Medications: None       Physical Exam  Temp:  [36.7 °C (98.1 °F)] 36.7 °C (98.1 °F)  Pulse:  [61] 61  Resp:  [17] 17  BP: (118)/(65) 118/65  SpO2:  [96 %] 96 %  Blood Pressure: 118/65   Temperature: 36.7 °C (98.1 °F)   Pulse: 61   Respiration: 17   Pulse Oximetry: 96 %       Physical Exam  Vitals and nursing note reviewed.   Constitutional:       General: He is not in acute distress.     Appearance: Normal appearance.   HENT:      Head: Normocephalic and atraumatic.   Eyes:      Extraocular Movements: Extraocular movements intact.      Conjunctiva/sclera: Conjunctivae normal.      Pupils: Pupils are equal, round, and reactive to light.   Cardiovascular:      Rate and Rhythm: Normal rate and regular rhythm.      Pulses: Normal pulses.      Heart sounds: No murmur heard.     No friction rub. No gallop.      Comments: Chest pain not reproducible on exam  Pulmonary:      Effort: Pulmonary effort is normal. No respiratory distress.      Breath sounds: Normal breath sounds. No wheezing, rhonchi or rales.   Abdominal:      General: Bowel sounds are normal. There is no distension.      Palpations: Abdomen is soft.      Tenderness: There is no abdominal tenderness.   Musculoskeletal:         General: No swelling or tenderness. Normal range of motion.      Cervical back: Normal range of motion and neck supple. No muscular tenderness.      Right lower leg: No edema.      Left lower leg: No edema.   Skin:     General: Skin is warm and dry.      Capillary Refill: Capillary refill takes less than 2 seconds.      Findings: No bruising, erythema or rash.   Neurological:      General: No focal deficit present.      Mental Status: He is alert and oriented to person, place, and time.         Laboratory:          No results for input(s): \"ALTSGPT\", \"ASTSGOT\", \"ALKPHOSPHAT\", \"TBILIRUBIN\", \"DBILIRUBIN\", \"GAMMAGT\", \"AMYLASE\", \"LIPASE\", \"ALB\", \"PREALBUMIN\", " "\"GLUCOSE\" in the last 72 hours.      No results for input(s): \"NTPROBNP\" in the last 72 hours.      No results for input(s): \"TROPONINT\" in the last 72 hours.    Imaging:  EC-ECHOCARDIOGRAM COMPLETE W/O CONT    (Results Pending)       X-Ray:  I have personally reviewed the images and compared with prior images.  EKG:  I have personally reviewed the images and compared with prior images.    Assessment/Plan:  Justification for Admission Status  I anticipate this patient will require at least two midnights for appropriate medical management, necessitating inpatient admission because will require further workup for possible pericarditis versus NSTEMI    Patient will need a Telemetry bed on MEDICAL service .  The need is secondary to elevated troponin.    * NSTEMI (non-ST elevated myocardial infarction) (HCC)- (present on admission)  Assessment & Plan  Concern for?  ESR, CRP  Troponin 434 at outside facility  EKG @ OSH with no acute ischemia, noted new LBBB  Repeat EKG pending  ECHO ordered  Cardiology consulted     Polysubstance abuse (HCC)  Assessment & Plan  Patient reported [place]  Counseled at length in regards to cessation    LBBB (left bundle branch block)  Assessment & Plan  New noted on EKG at OSH  Repeat EKG pending  Cardiology consulted    Pericarditis  Assessment & Plan  Concern for?  ESR, CRP  Repeat EKG pending  ECHO ordered        VTE prophylaxis: SCDs/TEDs and enoxaparin ppx  "

## 2025-05-01 NOTE — PROGRESS NOTES
4 Eyes Skin Assessment Completed by THEODORA Cook and THEODORA Contreras.    Head WDL  Ears WDL  Nose WDL  Mouth WDL  Neck WDL  Breast/Chest WDL  Shoulder Blades WDL  Spine WDL  (R) Arm/Elbow/Hand WDL  (L) Arm/Elbow/Hand WDL  Abdomen WDL  Groin Scab  Scrotum/Coccyx/Buttocks WDL  (R) Leg WDL  (L) Leg WDL  (R) Heel/Foot/Toe WDL  (L) Heel/Foot/Toe WDL          Devices In Places Tele Box, Blood Pressure Cuff, and Pulse Ox      Interventions In Place Pillows    Possible Skin Injury No    Pictures Uploaded Into Epic N/A  Wound Consult Placed N/A  RN Wound Prevention Protocol Ordered No

## 2025-05-02 ENCOUNTER — APPOINTMENT (OUTPATIENT)
Dept: CARDIOLOGY | Facility: MEDICAL CENTER | Age: 25
DRG: 287 | End: 2025-05-02
Attending: NURSE PRACTITIONER
Payer: COMMERCIAL

## 2025-05-02 LAB
ALBUMIN SERPL BCP-MCNC: 4 G/DL (ref 3.2–4.9)
ALBUMIN/GLOB SERPL: 1.3 G/DL
ALP SERPL-CCNC: 74 U/L (ref 30–99)
ALT SERPL-CCNC: 40 U/L (ref 2–50)
ANION GAP SERPL CALC-SCNC: 12 MMOL/L (ref 7–16)
AST SERPL-CCNC: 80 U/L (ref 12–45)
BILIRUB SERPL-MCNC: 0.4 MG/DL (ref 0.1–1.5)
BUN SERPL-MCNC: 21 MG/DL (ref 8–22)
CALCIUM ALBUM COR SERPL-MCNC: 9.1 MG/DL (ref 8.5–10.5)
CALCIUM SERPL-MCNC: 9.1 MG/DL (ref 8.5–10.5)
CHLORIDE SERPL-SCNC: 106 MMOL/L (ref 96–112)
CHOLEST SERPL-MCNC: 114 MG/DL (ref 100–199)
CO2 SERPL-SCNC: 22 MMOL/L (ref 20–33)
CREAT SERPL-MCNC: 0.97 MG/DL (ref 0.5–1.4)
EKG IMPRESSION: NORMAL
EKG IMPRESSION: NORMAL
ERYTHROCYTE [DISTWIDTH] IN BLOOD BY AUTOMATED COUNT: 43.8 FL (ref 35.9–50)
EST. AVERAGE GLUCOSE BLD GHB EST-MCNC: 94 MG/DL
GFR SERPLBLD CREATININE-BSD FMLA CKD-EPI: 110 ML/MIN/1.73 M 2
GLOBULIN SER CALC-MCNC: 3.2 G/DL (ref 1.9–3.5)
GLUCOSE SERPL-MCNC: 91 MG/DL (ref 65–99)
HBA1C MFR BLD: 4.9 % (ref 4–5.6)
HCT VFR BLD AUTO: 39.4 % (ref 42–52)
HDLC SERPL-MCNC: 26 MG/DL
HGB BLD-MCNC: 12.8 G/DL (ref 14–18)
LDLC SERPL CALC-MCNC: 65 MG/DL
LV EJECT FRACT  99904: 63
LV EJECT FRACT MOD 2C 99903: 63.98
LV EJECT FRACT MOD 4C 99902: 53.98
LV EJECT FRACT MOD BP 99901: 62.89
MAGNESIUM SERPL-MCNC: 2.2 MG/DL (ref 1.5–2.5)
MCH RBC QN AUTO: 29.1 PG (ref 27–33)
MCHC RBC AUTO-ENTMCNC: 32.5 G/DL (ref 32.3–36.5)
MCV RBC AUTO: 89.5 FL (ref 81.4–97.8)
PHOSPHATE SERPL-MCNC: 4.1 MG/DL (ref 2.5–4.5)
PLATELET # BLD AUTO: 307 K/UL (ref 164–446)
PMV BLD AUTO: 9.9 FL (ref 9–12.9)
POTASSIUM SERPL-SCNC: 4.1 MMOL/L (ref 3.6–5.5)
PROT SERPL-MCNC: 7.2 G/DL (ref 6–8.2)
RBC # BLD AUTO: 4.4 M/UL (ref 4.7–6.1)
SODIUM SERPL-SCNC: 140 MMOL/L (ref 135–145)
T PALLIDUM AB SER QL IA: NORMAL
TRIGL SERPL-MCNC: 115 MG/DL (ref 0–149)
TROPONIN T SERPL-MCNC: 1563 NG/L (ref 6–19)
TROPONIN T SERPL-MCNC: 1654 NG/L (ref 6–19)
TROPONIN T SERPL-MCNC: 1975 NG/L (ref 6–19)
TROPONIN T SERPL-MCNC: 714 NG/L (ref 6–19)
TSH SERPL DL<=0.005 MIU/L-ACNC: 2.16 UIU/ML (ref 0.38–5.33)
WBC # BLD AUTO: 9.6 K/UL (ref 4.8–10.8)

## 2025-05-02 PROCEDURE — 86694 HERPES SIMPLEX NES ANTBDY: CPT

## 2025-05-02 PROCEDURE — 80053 COMPREHEN METABOLIC PANEL: CPT

## 2025-05-02 PROCEDURE — 83036 HEMOGLOBIN GLYCOSYLATED A1C: CPT

## 2025-05-02 PROCEDURE — 99233 SBSQ HOSP IP/OBS HIGH 50: CPT | Mod: FS | Performed by: INTERNAL MEDICINE

## 2025-05-02 PROCEDURE — 86696 HERPES SIMPLEX TYPE 2 TEST: CPT

## 2025-05-02 PROCEDURE — 770020 HCHG ROOM/CARE - TELE (206)

## 2025-05-02 PROCEDURE — 80061 LIPID PANEL: CPT

## 2025-05-02 PROCEDURE — 84484 ASSAY OF TROPONIN QUANT: CPT

## 2025-05-02 PROCEDURE — A9270 NON-COVERED ITEM OR SERVICE: HCPCS | Performed by: GENERAL PRACTICE

## 2025-05-02 PROCEDURE — 700111 HCHG RX REV CODE 636 W/ 250 OVERRIDE (IP): Mod: JZ | Performed by: GENERAL PRACTICE

## 2025-05-02 PROCEDURE — 93010 ELECTROCARDIOGRAM REPORT: CPT | Mod: 76 | Performed by: INTERNAL MEDICINE

## 2025-05-02 PROCEDURE — A9270 NON-COVERED ITEM OR SERVICE: HCPCS | Performed by: INTERNAL MEDICINE

## 2025-05-02 PROCEDURE — 86780 TREPONEMA PALLIDUM: CPT

## 2025-05-02 PROCEDURE — 86695 HERPES SIMPLEX TYPE 1 TEST: CPT

## 2025-05-02 PROCEDURE — 99233 SBSQ HOSP IP/OBS HIGH 50: CPT | Performed by: INTERNAL MEDICINE

## 2025-05-02 PROCEDURE — 93010 ELECTROCARDIOGRAM REPORT: CPT | Performed by: INTERNAL MEDICINE

## 2025-05-02 PROCEDURE — 93005 ELECTROCARDIOGRAM TRACING: CPT | Mod: TC | Performed by: GENERAL PRACTICE

## 2025-05-02 PROCEDURE — 85027 COMPLETE CBC AUTOMATED: CPT

## 2025-05-02 PROCEDURE — 700111 HCHG RX REV CODE 636 W/ 250 OVERRIDE (IP): Mod: JZ | Performed by: NURSE PRACTITIONER

## 2025-05-02 PROCEDURE — 84443 ASSAY THYROID STIM HORMONE: CPT

## 2025-05-02 PROCEDURE — 93306 TTE W/DOPPLER COMPLETE: CPT | Mod: 26 | Performed by: INTERNAL MEDICINE

## 2025-05-02 PROCEDURE — 93306 TTE W/DOPPLER COMPLETE: CPT

## 2025-05-02 PROCEDURE — 84100 ASSAY OF PHOSPHORUS: CPT

## 2025-05-02 PROCEDURE — 83735 ASSAY OF MAGNESIUM: CPT

## 2025-05-02 PROCEDURE — 700102 HCHG RX REV CODE 250 W/ 637 OVERRIDE(OP): Performed by: GENERAL PRACTICE

## 2025-05-02 PROCEDURE — 700102 HCHG RX REV CODE 250 W/ 637 OVERRIDE(OP): Performed by: INTERNAL MEDICINE

## 2025-05-02 RX ORDER — MORPHINE SULFATE 4 MG/ML
4 INJECTION INTRAVENOUS
Status: DISCONTINUED | OUTPATIENT
Start: 2025-05-02 | End: 2025-05-03 | Stop reason: HOSPADM

## 2025-05-02 RX ADMIN — OMEPRAZOLE 20 MG: 20 CAPSULE, DELAYED RELEASE ORAL at 16:30

## 2025-05-02 RX ADMIN — IBUPROFEN 800 MG: 800 TABLET, FILM COATED ORAL at 16:30

## 2025-05-02 RX ADMIN — MORPHINE SULFATE 4 MG: 4 INJECTION, SOLUTION INTRAMUSCULAR; INTRAVENOUS at 04:38

## 2025-05-02 RX ADMIN — OMEPRAZOLE 20 MG: 20 CAPSULE, DELAYED RELEASE ORAL at 05:47

## 2025-05-02 RX ADMIN — ENOXAPARIN SODIUM 40 MG: 100 INJECTION SUBCUTANEOUS at 18:05

## 2025-05-02 RX ADMIN — COLCHICINE 0.6 MG: 0.6 TABLET, FILM COATED ORAL at 05:47

## 2025-05-02 RX ADMIN — IBUPROFEN 800 MG: 800 TABLET, FILM COATED ORAL at 09:03

## 2025-05-02 RX ADMIN — IBUPROFEN 800 MG: 800 TABLET, FILM COATED ORAL at 22:02

## 2025-05-02 RX ADMIN — COLCHICINE 0.6 MG: 0.6 TABLET, FILM COATED ORAL at 16:30

## 2025-05-02 RX ADMIN — KETOROLAC TROMETHAMINE 15 MG: 15 INJECTION, SOLUTION INTRAMUSCULAR; INTRAVENOUS at 03:52

## 2025-05-02 ASSESSMENT — ENCOUNTER SYMPTOMS
SHORTNESS OF BREATH: 0
LOSS OF CONSCIOUSNESS: 0
FOCAL WEAKNESS: 0
NAUSEA: 0
DIARRHEA: 0
WHEEZING: 0
HEARTBURN: 0
ABDOMINAL PAIN: 0
PALPITATIONS: 0
WEIGHT LOSS: 0
VOMITING: 1
BLURRED VISION: 0
DOUBLE VISION: 0
MYALGIAS: 0
COUGH: 0
CHEST TIGHTNESS: 0
SORE THROAT: 0
CHILLS: 0
HEADACHES: 0
DIZZINESS: 0
ORTHOPNEA: 0
FEVER: 0

## 2025-05-02 ASSESSMENT — PAIN DESCRIPTION - PAIN TYPE
TYPE: ACUTE PAIN

## 2025-05-02 ASSESSMENT — FIBROSIS 4 INDEX: FIB4 SCORE: 1.07

## 2025-05-02 NOTE — CARE PLAN
The patient is Watcher - Medium risk of patient condition declining or worsening    Shift Goals    Clinical Goals: VSS, tele monitoring, monitor labs  Patient Goals: rest, comfort, updates  Family Goals: updates    Progress made toward(s) clinical / shift goals:      Problem: Knowledge Deficit - Standard  Goal: Patient and family/care givers will demonstrate understanding of plan of care, disease process/condition, diagnostic tests and medications  Description: Target End Date:  1-3 days or as soon as patient condition allowsDocument in Patient Education1.  Patient and family/caregiver oriented to unit, equipment, visitation policy and means for communicating concern2.  Complete/review Learning Assessment3.  Assess knowledge level of disease process/condition, treatment plan, diagnostic tests and medications4.  Explain disease process/condition, treatment plan, diagnostic tests and medications  Outcome: Progressing     Problem: Pain - Standard  Goal: Alleviation of pain or a reduction in pain to the patient’s comfort goal  Description: Target End Date:  Prior to discharge or change in level of careDocument on Vitals flowsheet1.  Document pain using the appropriate pain scale per order or unit policy2.  Educate and implement non-pharmacologic comfort measures (i.e. relaxation, distraction, massage, cold/heat therapy, etc.)3.  Pain management medications as ordered4.  Reassess pain after pain med administration per policy5.  If opiods administered assess patient's response to pain medication is appropriate per POSS sedation scale6.  Follow pain management plan developed in collaboration with patient and interdisciplinary team (including palliative care or pain specialists if applicable)  Outcome: Progressing     Problem: Hemodynamics  Goal: Patient's hemodynamics, fluid balance and neurologic status will be stable or improve  Description: Target End Date:  Prior to discharge or change in level of careDocument on  Assessment and I/O flowsheet templates1.  Monitor vital signs, pulse oximetry and cardiac monitor per provider order and/or policy2.  Maintain blood pressure per provider order3.  Hemodynamic monitoring per provider order4.  Manage IV fluids and IV infusions5.  Monitor intake and output6.  Daily weights per unit policy or provider order7.  Assess peripheral pulses and capillary refill8.  Assess color and body temperature9.  Position patient for maximum circulation/cardiac [id number]. Monitor for signs/symptoms of excessive [id number]. Assess mental status, restlessness and changes in level of [id number]. Monitor temperature and report fever or hypothermia to provider immediately. Consideration of targeted temperature management.  Outcome: Progressing     Problem: Infection - Standard  Goal: Patient will remain free from infection  Description: Target End Date:  Prior to discharge or change in level of care1.  Utilize Standard Precautions at all times to reduce the risk of transmission of microorganisms from both recognized andunrecognized sources of infection2.  Infection prevention handouts provided (general/device/diagnosis specific) and documented in Patient Education3.  Educate patient and family/caregiver on isolation precautions if applicable  Outcome: Progressing

## 2025-05-02 NOTE — PROGRESS NOTES
Monitor Summary:     Rhythm: SR  Rate: 61  Ectopy: -  Measurements: 0.15/0.08/0.39           12 Hour Chart Check

## 2025-05-02 NOTE — PROGRESS NOTES
Monitor Summary:    SR (60-68)    No ectopy... HR dropped to 43 bpm... I/O of IdioVT    0.19/0.07/0.38

## 2025-05-02 NOTE — CARE PLAN
The patient is Stable - Low risk of patient condition declining or worsening    Shift Goals  Clinical Goals: monitor labs, VS, echo, angiogram  Patient Goals: rest, comfort  Family Goals: updates    Progress made toward(s) clinical / shift goals:    Problem: Pain - Standard  Goal: Alleviation of pain or a reduction in pain to the patient’s comfort goal  Description: Target End Date:  Prior to discharge or change in level of careDocument on Vitals flowsheet1.  Document pain using the appropriate pain scale per order or unit policy2.  Educate and implement non-pharmacologic comfort measures (i.e. relaxation, distraction, massage, cold/heat therapy, etc.)3.  Pain management medications as ordered4.  Reassess pain after pain med administration per policy5.  If opiods administered assess patient's response to pain medication is appropriate per POSS sedation scale6.  Follow pain management plan developed in collaboration with patient and interdisciplinary team (including palliative care or pain specialists if applicable)  Outcome: Progressing     Problem: Hemodynamics  Goal: Patient's hemodynamics, fluid balance and neurologic status will be stable or improve  Description: Target End Date:  Prior to discharge or change in level of careDocument on Assessment and I/O flowsheet templates1.  Monitor vital signs, pulse oximetry and cardiac monitor per provider order and/or policy2.  Maintain blood pressure per provider order3.  Hemodynamic monitoring per provider order4.  Manage IV fluids and IV infusions5.  Monitor intake and output6.  Daily weights per unit policy or provider order7.  Assess peripheral pulses and capillary refill8.  Assess color and body temperature9.  Position patient for maximum circulation/cardiac [id number]. Monitor for signs/symptoms of excessive [id number]. Assess mental status, restlessness and changes in level of ejdactpfklqvf22. Monitor temperature and report fever or hypothermia to provider  immediately. Consideration of targeted temperature management.  Outcome: Progressing       Patient is not progressing towards the following goals:

## 2025-05-02 NOTE — PROGRESS NOTES
Handoff report had been received from day shift nurse. Pt care is assumed. Pt is currently resting in bed. POC discussed with pt. Pt verbalizes no questions at this time. Pt is A&Ox4 on RA on tele monitoring and VSS. Call light/belongings are within reach. Pt verbalizes needs are met at this time.

## 2025-05-02 NOTE — PROGRESS NOTES
Pt is complaining of chest pain similar to what had initially brought him in. Provider notified of pt pain and tele monitor findings. Per provider toradol given for pain.    Ambulatory

## 2025-05-02 NOTE — DISCHARGE PLANNING
Care Transition Team Assessment    RNCM met with patient at the bedside.  Patient A&Ox4 and able to verify the information on the face sheet.  Patient lives alone, in a single story home, in [facility].  Patient has access to transportation.  Pt's emergency contact is mother, Laura Penn.  Patient has [employer] for medical coverage.  Patient is employed full time with [employer].  Patient was independent prior to hospitalization without use of DME.  No home oxygen.  Pt does not have a PCP.  Pt uses the [facility] pharmacy in [place].  Pt denies mental health concerns.  Pt denies ETOH use.  Pt stated he uses marijuana daily, but denied other regular drug use.  Pt does Vape regularly.      Information Source  Orientation Level: Oriented X4  Information Given By: Patient  Who is responsible for making decisions for patient? : Patient    Readmission Evaluation  Is this a readmission?: No    Elopement Risk  Legal Hold: No  Ambulatory or Self Mobile in Wheelchair: Yes  Disoriented: No  Psychiatric Symptoms: None  History of Wandering: No  Elopement this Admit: No  Vocalizing Wanting to Leave: No  Displays Behaviors, Body Language Wanting to Leave: No-Not at Risk for Elopement  Elopement Risk: Not at Risk for Elopement    Interdisciplinary Discharge Planning  Lives with - Patient's Self Care Capacity: Alone and Able to Care For Self  Patient or legal guardian wants to designate a caregiver: No  Support Systems: Friends / Neighbors, Parent, Family Member(s)  Housing / Facility: Mobile Home    Discharge Preparedness  What is your plan after discharge?: Home with help  What are your discharge supports?: Parent  Prior Functional Level: Ambulatory, Independent with Activities of Daily Living  Difficulity with ADLs: None  Difficulity with IADLs: None    Functional Assesment  Prior Functional Level: Ambulatory, Independent with Activities of Daily Living    Finances  Financial Barriers to Discharge: No  Prescription Coverage:  Yes    Vision / Hearing Impairment  Vision Impairment : No  Hearing Impairment : No    Advance Directive  Advance Directive?: None  Advance Directive offered?: AD Booklet refused    Domestic Abuse  Possible Abuse/Neglect Reported to:: Not Applicable    Psychological Assessment  History of Substance Abuse: Cocaine  Date Last Used - Cocaine: 2 weeks ago  History of Psychiatric Problems: No  Non-compliant with Treatment: No  Newly Diagnosed Illness: Yes    Discharge Risks or Barriers  Discharge risks or barriers?: Complex medical needs  Patient risk factors: Complex medical needs    Anticipated Discharge Information  Discharge Disposition: Discharged to home/self care (01)  Discharge Address: [address] [place] [address] [address] [place] [address]  Discharge Contact Phone Number: [phone]

## 2025-05-02 NOTE — PROGRESS NOTES
Daily Progress Note    This note is for teaching purposes only. I evaluated and examined the patient independently of the medical student. Please refer to the provider's note for full clinical details.    Date of Service  5/2/2025    Chief Complaint  Blayne Be is a 24 y.o. male admitted 5/1/2025 with chest pain shortness of breath and was found to have findings consistent with viral pericarditis.     Hospital Course  Patient visited OSH yesterday and was transferred here for further evaluation and workup.   Patient started on Ibuprofen taper and colchicine  Patient feeling better this AM 5/2  Patients trops up trending 732 > 714 > 1975    Interval Problem Update  5/2 - Patients trops up trending despite improved symptoms  5/2 - echo normal LVEF and wall motion  5/2 - NPO for [facility]  5/2 - added Nitrates after cath in case of vasospasm    I have discussed this patient's plan of care and discharge plan at IDT rounds today with Case Management, Nursing, Nursing leadership, and other members of the IDT team.    Consultants/Specialty  cardiology    Code Status  Full Code    Disposition  The patient is not medically cleared for discharge to home or a post-acute facility.  Anticipate discharge to: home with close outpatient follow-up    I have placed the appropriate orders for post-discharge needs.    Review of Systems  Review of Systems   Constitutional:  Negative for chills, fever and weight loss.   HENT:  Negative for sore throat.    Eyes:  Negative for blurred vision and double vision.   Respiratory:  Negative for cough, shortness of breath and wheezing.    Cardiovascular:  Negative for chest pain, palpitations, orthopnea and leg swelling.   Gastrointestinal:  Positive for vomiting. Negative for abdominal pain, diarrhea, heartburn and nausea.   Musculoskeletal:  Negative for myalgias.   Neurological:  Negative for dizziness, focal weakness, loss of consciousness and headaches.        Physical Exam  Temp:  [36.2 °C  (97.2 °F)-37.1 °C (98.8 °F)] 37.1 °C (98.8 °F)  Pulse:  [59-71] 59  Resp:  [16-20] 20  BP: (118-143)/(65-96) 126/66  SpO2:  [96 %-99 %] 96 %    Physical Exam  Constitutional:       Appearance: Normal appearance. He is normal weight.   HENT:      Head: Normocephalic and atraumatic.      Mouth/Throat:      Comments: Scabbed over pimple just below right lower lip  Cardiovascular:      Rate and Rhythm: Normal rate and regular rhythm.      Pulses: Normal pulses.      Heart sounds: Normal heart sounds.   Pulmonary:      Effort: Pulmonary effort is normal.      Breath sounds: Normal breath sounds.   Abdominal:      General: Abdomen is flat. There is no distension.      Palpations: Abdomen is soft.      Tenderness: There is no abdominal tenderness.   Musculoskeletal:         General: Normal range of motion.      Right lower leg: No edema.      Left lower leg: No edema.   Skin:     General: Skin is warm and dry.      Capillary Refill: Capillary refill takes less than 2 seconds.   Neurological:      General: No focal deficit present.      Mental Status: He is alert and oriented to person, place, and time.   Psychiatric:         Mood and Affect: Mood normal.         Behavior: Behavior normal.         Thought Content: Thought content normal.         Fluids    Intake/Output Summary (Last 24 hours) at 5/2/2025 1237  Last data filed at 5/2/2025 0400  Gross per 24 hour   Intake 360 ml   Output 300 ml   Net 60 ml        Laboratory  Recent Labs     05/02/25  0200   WBC 9.6   RBC 4.40*   HEMOGLOBIN 12.8*   HEMATOCRIT 39.4*   MCV 89.5   MCH 29.1   MCHC 32.5   RDW 43.8   PLATELETCT 307   MPV 9.9     Recent Labs     05/02/25  0200   SODIUM 140   POTASSIUM 4.1   CHLORIDE 106   CO2 22   GLUCOSE 91   BUN 21   CREATININE 0.97   CALCIUM 9.1             Recent Labs     05/02/25  0200   TRIGLYCERIDE 115   HDL 26*   LDL 65       Imaging  EC-ECHOCARDIOGRAM COMPLETE W/O CONT   Final Result      CL-LEFT HEART CATHETERIZATION WITH POSSIBLE  INTERVENTION    (Results Pending)        Assessment/Plan  * Pericarditis  Assessment & Plan  No recent viral illness that he can recall.   Repeat EKG shows no acute ischemia  ECHO 5/2 > normal LVEF and wall function  Started on Ibuprofen taper and colchicine  - Ibuprofen 800mg TID x 1 week  - Ibuprofen 800mg BID x 1 week  - Ibuprofen 400mg BID x 2 week  - Colchicine 0.6mg BID x 3 months  Troponin is uptrending cardiology is not convinced and wants to perform LHC.     Myocarditis (HCC)  Assessment & Plan  His troponin is trending up and is now up to 1900  Cardiology following and case discussed and plan is for LHC today   Potential vasospasm in setting of substance use    Polysubstance abuse (HCC)  Assessment & Plan  Patient reported [place] cocaine [place]  Counseled at length in regards to cessation and the effect of substances on the heart         VTE prophylaxis: SCDs and Enoxaparin         I have performed a physical exam and reviewed and updated ROS and Plan today (5/2/2025). In review of yesterday's note (5/1/2025), there are no changes except as documented above.    Jm Chung  MS3

## 2025-05-02 NOTE — PROGRESS NOTES
Bedside report received from NOC RN. Assumed care of pt. Pt awake, laying in bed. A/Ox4, VSS. No concerns, complaints or distress. Pt educated to call before getting out of bed. POC reviewed and white board updated. Tele box on. SR 60 on the monitor. Call light in reach. Bed locked in lowest position with 2 upper bed rails up.

## 2025-05-02 NOTE — PROGRESS NOTES
"Cardiology Follow Up Progress Note    Date of Service  5/2/2025    Attending Physician  Adam Bangura M.D.    Chief Complaint   Chest pain    HPI  Blayne Be is a 24 y.o. male admitted 5/1/2025 with per Dr. Vera, \"without significant past medical history, presented to outside hospital with chest pain.  Patient was transferred to [facility] for elevated troponin further management.     He last felt like his normal self April 21, then he started noticing fevers, body aches, congestions thought he might have had a cold.  Staying about the same.  Yesterday he left work and took a hit of his vaping and felt pain with breathing and then started having chest pain.  It was intermittent off and on, comes few seconds and then goes away.       He had been to [facility] 4/20.  He did do cocaine there, but none since and he normally does not do any drugs, no meth.  He has only used cocaine about 6 x in his lifetime.      He has used cocaine about 6 x in his life.  No meth.  He vapes.  He smokes weed.    No family history of premature CAD.    No sudden death.   No major childhood illness.\"      Interim Events  5/2/2025: Chest pain overnight, toradol given. Tele monitoring personally interpreted by me shows SR. ST changes on EKG, inconsistent. VSS; RA.Labs reviewed trops increased. Echo reviewed.   Disposition: NPO for [facility]     Review of Systems  Review of Systems   Respiratory:  Negative for chest tightness and shortness of breath.    Cardiovascular:  Positive for chest pain. Negative for palpitations and leg swelling.       Vital signs in last 24 hours  Temp:  [36.2 °C (97.2 °F)-37 °C (98.6 °F)] 36.2 °C (97.2 °F)  Pulse:  [60-71] 60  Resp:  [16-20] 18  BP: (118-143)/(65-96) 143/96  SpO2:  [96 %-99 %] 99 %    Physical Exam  Physical Exam  Vitals reviewed.   Constitutional:       Appearance: Normal appearance.   Cardiovascular:      Rate and Rhythm: Normal rate and regular rhythm.      Pulses: Normal pulses.   Pulmonary:      Effort: " "Pulmonary effort is normal. No respiratory distress.   Musculoskeletal:      Right lower leg: No edema.      Left lower leg: No edema.   Skin:     General: Skin is warm and dry.   Neurological:      General: No focal deficit present.      Mental Status: He is alert and oriented to person, place, and time.         Lab Review  Lab Results   Component Value Date/Time    WBC 9.6 05/02/2025 02:00 AM    RBC 4.40 (L) 05/02/2025 02:00 AM    HEMOGLOBIN 12.8 (L) 05/02/2025 02:00 AM    HEMATOCRIT 39.4 (L) 05/02/2025 02:00 AM    MCV 89.5 05/02/2025 02:00 AM    MCH 29.1 05/02/2025 02:00 AM    MCHC 32.5 05/02/2025 02:00 AM    MPV 9.9 05/02/2025 02:00 AM      Lab Results   Component Value Date/Time    SODIUM 140 05/02/2025 02:00 AM    POTASSIUM 4.1 05/02/2025 02:00 AM    CHLORIDE 106 05/02/2025 02:00 AM    CO2 22 05/02/2025 02:00 AM    GLUCOSE 91 05/02/2025 02:00 AM    BUN 21 05/02/2025 02:00 AM    CREATININE 0.97 05/02/2025 02:00 AM      Lab Results   Component Value Date/Time    ASTSGOT 80 (H) 05/02/2025 02:00 AM    ALTSGPT 40 05/02/2025 02:00 AM     Lab Results   Component Value Date/Time    CHOLSTRLTOT 114 05/02/2025 02:00 AM    LDL 65 05/02/2025 02:00 AM    HDL 26 (A) 05/02/2025 02:00 AM    TRIGLYCERIDE 115 05/02/2025 02:00 AM    TROPONINT 714 (H) 05/02/2025 02:00 AM        Latest Reference Range & Units 05/02/25 02:00 05/02/25 08:17   Troponin T 6 - 19 ng/L 714 (H) 1975 (H)     No results for input(s): \"NTPROBNP\" in the last 72 hours.    Cardiac Imaging and Procedures Review    Echocardiogram:  pending    Cardiac Catheterization:  pending      Assessment/Plan   Chest pain with trop 706 here likely viral myocpericarditis.  Elevated hsCRP.  Pain did improve with toradal.  Unlikely acute coronary syndrome from plaque rupture in a 24 year old without CAD risk factors.  I did discuss cocaine can cause coronary vasospasm and MI and strokes but seems out of the time frame. Inconsistent ST changes on EKG   Trop uptrending 714 " -->1975  IV toradol  NPO for possible cath  Ibuprofen 800 mg po tid x 1 week then 800 mg po bid x 7 days then 400 mg po bid x 14 days  Colchicine 0.6 mg po bid x 3 months  Echo pending formal interpretation      Thank you for allowing me to participate in the care of this patient.  I will continue to follow this patient    Please contact me with any questions.    Please see Dr. Vera's attestation for further details and MDM.     I personally spent a total of 10 minutes which includes face-to-face time and non-face-to-face time spent on preparing to see the patient, reviewing hospital notes and tests, obtaining history from the patient, performing a medically appropriate exam, counseling and educating the patient, ordering medications/tests/procedures/referrals as clinically indicated, and documenting information in the electronic medical record.    VERONICA Calderon, CCK, HF-CERT   [facility]  [phone]

## 2025-05-02 NOTE — ASSESSMENT & PLAN NOTE
His troponin is trending up and is now up to 1900  Cardiology following and case discussed and plan is for [facility] today

## 2025-05-02 NOTE — PROGRESS NOTES
"Hospital Medicine Daily Progress Note    Date of Service  5/2/2025    Chief Complaint  Blayne Be is a 24 y.o. male admitted 5/1/2025 with chest pain     Hospital Course  This is a 24-year-old male who was transferred from outside facility on 05/1 due to chest pain, shortness of breath.     Patient reports that he attended [employer] about 2 weeks ago, at that event he did participate in use of \"poppers\" and possibly cocaine.  Over the past 24 hours he has been having intermittent shortness of breath and chest pain, noted worse on exertion and with deep inspiration.     At outside facility chest x-ray unremarkable, EKG noted possible new onset left bundle branch block, troponin was 434.  Patient reports he felt some relief with Toradol however the pain has returned.    Interval Problem Update  Patient seen and examined, cardiology following and case discussed his troponin trending up now at 1900   Plan is for [facility] today   Close monitor for decompensation   I have discussed this patient's plan of care and discharge plan at IDT rounds today with Case Management, Nursing, Nursing leadership, and other members of the IDT team.    Consultants/Specialty  cardiology    Code Status  Full Code    Disposition  The patient is not medically cleared for discharge to home or a post-acute facility.      I have placed the appropriate orders for post-discharge needs.    Review of Systems  ROS     Physical Exam  Temp:  [36.2 °C (97.2 °F)-37.1 °C (98.8 °F)] 37.1 °C (98.8 °F)  Pulse:  [59-71] 59  Resp:  [16-20] 20  BP: (118-143)/(65-96) 126/66  SpO2:  [96 %-99 %] 96 %    Physical Exam    Fluids    Intake/Output Summary (Last 24 hours) at 5/2/2025 1227  Last data filed at 5/2/2025 0400  Gross per 24 hour   Intake 360 ml   Output 300 ml   Net 60 ml        Laboratory  Recent Labs     05/02/25  0200   WBC 9.6   RBC 4.40*   HEMOGLOBIN 12.8*   HEMATOCRIT 39.4*   MCV 89.5   MCH 29.1   MCHC 32.5   RDW 43.8   PLATELETCT 307   MPV 9.9 "     Recent Labs     05/02/25  0200   SODIUM 140   POTASSIUM 4.1   CHLORIDE 106   CO2 22   GLUCOSE 91   BUN 21   CREATININE 0.97   CALCIUM 9.1             Recent Labs     05/02/25  0200   TRIGLYCERIDE 115   HDL 26*   LDL 65       Imaging  EC-ECHOCARDIOGRAM COMPLETE W/O CONT   Final Result      CL-LEFT HEART CATHETERIZATION WITH POSSIBLE INTERVENTION    (Results Pending)        Assessment/Plan  * Pericarditis  Assessment & Plan  Concern for?  ESR, CRP  Repeat EKG pending  ECHO ordered    Myocarditis (HCC)  Assessment & Plan  His troponin is trending up and is now up to 1900  Cardiology following and case discussed and plan is for [facility] today     Polysubstance abuse (HCC)  Assessment & Plan  Patient reported [place] possibly [place] po[place]  Counseled at length in regards to cessation         VTE prophylaxis: Lovenox ppx    Greater than 51 minutes spent prepping to see patient (e.g. review of tests) obtaining and/or reviewing separately obtained history. Performing a medically appropriate examination and/ evaluation.  Counseling and educating the patient/family/caregiver.  Ordering medications, tests, or procedures.  Referring and communicating with other health care professionals.  Documenting clinical information in EPIC.  Independently interpreting results and communicating results to patient/family/caregiver.  Care coordination.      I have performed a physical exam and reviewed and updated ROS and Plan today (5/2/2025). In review of yesterday's note (5/1/2025), there are no changes except as documented above.

## 2025-05-02 NOTE — PROGRESS NOTES
NOC ApRN CROSS COVER NOTE    Notified by bedside RN that patient is complaining of worsening chest pain, had episode of nausea with emesis, also noted telemetry changes.      Repeat EKG shows patient is in junctional rhythm with diffuse ST elevation consistent with pericarditis, VSS.  Reviewed EKG with collaborating physician, continue with supportive care for pericarditis.  Will order IV morphine and continue to trend troponins.  Echocardiogram is pending and cardiology is following.      Nicolle Candelario, MSN, APRN  [facility]    Please note this dictation was created using voice recognition software.  I have made every reasonable attempt to correct obvious errors, but there may be errors of grammar and possibly content that I did not discover before finalizing the note.

## 2025-05-03 ENCOUNTER — APPOINTMENT (OUTPATIENT)
Dept: CARDIOLOGY | Facility: MEDICAL CENTER | Age: 25
DRG: 287 | End: 2025-05-03
Attending: NURSE PRACTITIONER
Payer: COMMERCIAL

## 2025-05-03 ENCOUNTER — PHARMACY VISIT (OUTPATIENT)
Dept: PHARMACY | Facility: MEDICAL CENTER | Age: 25
End: 2025-05-03
Payer: COMMERCIAL

## 2025-05-03 VITALS
RESPIRATION RATE: 17 BRPM | SYSTOLIC BLOOD PRESSURE: 114 MMHG | OXYGEN SATURATION: 95 % | DIASTOLIC BLOOD PRESSURE: 69 MMHG | HEART RATE: 66 BPM | BODY MASS INDEX: 24.91 KG/M2 | TEMPERATURE: 98.2 F | WEIGHT: 183.64 LBS

## 2025-05-03 LAB
ANION GAP SERPL CALC-SCNC: 10 MMOL/L (ref 7–16)
BUN SERPL-MCNC: 18 MG/DL (ref 8–22)
CALCIUM SERPL-MCNC: 9.1 MG/DL (ref 8.5–10.5)
CHLORIDE SERPL-SCNC: 105 MMOL/L (ref 96–112)
CO2 SERPL-SCNC: 24 MMOL/L (ref 20–33)
CREAT SERPL-MCNC: 0.81 MG/DL (ref 0.5–1.4)
ERYTHROCYTE [DISTWIDTH] IN BLOOD BY AUTOMATED COUNT: 43.3 FL (ref 35.9–50)
GFR SERPLBLD CREATININE-BSD FMLA CKD-EPI: 124 ML/MIN/1.73 M 2
GLUCOSE SERPL-MCNC: 107 MG/DL (ref 65–99)
HCT VFR BLD AUTO: 38.9 % (ref 42–52)
HGB BLD-MCNC: 12.3 G/DL (ref 14–18)
MCH RBC QN AUTO: 28.7 PG (ref 27–33)
MCHC RBC AUTO-ENTMCNC: 31.6 G/DL (ref 32.3–36.5)
MCV RBC AUTO: 90.7 FL (ref 81.4–97.8)
PLATELET # BLD AUTO: 307 K/UL (ref 164–446)
PMV BLD AUTO: 10 FL (ref 9–12.9)
POTASSIUM SERPL-SCNC: 4.3 MMOL/L (ref 3.6–5.5)
RBC # BLD AUTO: 4.29 M/UL (ref 4.7–6.1)
SODIUM SERPL-SCNC: 139 MMOL/L (ref 135–145)
WBC # BLD AUTO: 8.3 K/UL (ref 4.8–10.8)

## 2025-05-03 PROCEDURE — 99152 MOD SED SAME PHYS/QHP 5/>YRS: CPT | Performed by: INTERNAL MEDICINE

## 2025-05-03 PROCEDURE — 80048 BASIC METABOLIC PNL TOTAL CA: CPT

## 2025-05-03 PROCEDURE — 700102 HCHG RX REV CODE 250 W/ 637 OVERRIDE(OP): Performed by: INTERNAL MEDICINE

## 2025-05-03 PROCEDURE — 700102 HCHG RX REV CODE 250 W/ 637 OVERRIDE(OP)

## 2025-05-03 PROCEDURE — 4A023N7 MEASUREMENT OF CARDIAC SAMPLING AND PRESSURE, LEFT HEART, PERCUTANEOUS APPROACH: ICD-10-PCS | Performed by: INTERNAL MEDICINE

## 2025-05-03 PROCEDURE — A9270 NON-COVERED ITEM OR SERVICE: HCPCS

## 2025-05-03 PROCEDURE — 700105 HCHG RX REV CODE 258: Performed by: INTERNAL MEDICINE

## 2025-05-03 PROCEDURE — 99239 HOSP IP/OBS DSCHRG MGMT >30: CPT | Performed by: INTERNAL MEDICINE

## 2025-05-03 PROCEDURE — A9270 NON-COVERED ITEM OR SERVICE: HCPCS | Performed by: GENERAL PRACTICE

## 2025-05-03 PROCEDURE — 700111 HCHG RX REV CODE 636 W/ 250 OVERRIDE (IP): Mod: JZ

## 2025-05-03 PROCEDURE — 700102 HCHG RX REV CODE 250 W/ 637 OVERRIDE(OP): Performed by: GENERAL PRACTICE

## 2025-05-03 PROCEDURE — 93458 L HRT ARTERY/VENTRICLE ANGIO: CPT | Mod: 26 | Performed by: INTERNAL MEDICINE

## 2025-05-03 PROCEDURE — B2111ZZ FLUOROSCOPY OF MULTIPLE CORONARY ARTERIES USING LOW OSMOLAR CONTRAST: ICD-10-PCS | Performed by: INTERNAL MEDICINE

## 2025-05-03 PROCEDURE — C1769 GUIDE WIRE: HCPCS

## 2025-05-03 PROCEDURE — 85027 COMPLETE CBC AUTOMATED: CPT

## 2025-05-03 PROCEDURE — B2151ZZ FLUOROSCOPY OF LEFT HEART USING LOW OSMOLAR CONTRAST: ICD-10-PCS | Performed by: INTERNAL MEDICINE

## 2025-05-03 PROCEDURE — 700117 HCHG RX CONTRAST REV CODE 255: Performed by: INTERNAL MEDICINE

## 2025-05-03 PROCEDURE — 99232 SBSQ HOSP IP/OBS MODERATE 35: CPT | Performed by: INTERNAL MEDICINE

## 2025-05-03 PROCEDURE — A9270 NON-COVERED ITEM OR SERVICE: HCPCS | Performed by: INTERNAL MEDICINE

## 2025-05-03 PROCEDURE — 700101 HCHG RX REV CODE 250

## 2025-05-03 PROCEDURE — RXMED WILLOW AMBULATORY MEDICATION CHARGE: Performed by: INTERNAL MEDICINE

## 2025-05-03 RX ORDER — MIDAZOLAM HYDROCHLORIDE 1 MG/ML
INJECTION INTRAMUSCULAR; INTRAVENOUS
Status: COMPLETED
Start: 2025-05-03 | End: 2025-05-03

## 2025-05-03 RX ORDER — SODIUM CHLORIDE 9 MG/ML
3 INJECTION, SOLUTION INTRAVENOUS CONTINUOUS
Status: ACTIVE | OUTPATIENT
Start: 2025-05-03 | End: 2025-05-03

## 2025-05-03 RX ORDER — ASPIRIN 81 MG/1
TABLET, CHEWABLE ORAL
Status: COMPLETED
Start: 2025-05-03 | End: 2025-05-03

## 2025-05-03 RX ORDER — IBUPROFEN 800 MG/1
800 TABLET, FILM COATED ORAL 3 TIMES DAILY
Qty: 18 TABLET | Refills: 0 | Status: SHIPPED | OUTPATIENT
Start: 2025-05-03 | End: 2025-05-09

## 2025-05-03 RX ORDER — LIDOCAINE HYDROCHLORIDE 20 MG/ML
INJECTION, SOLUTION INFILTRATION; PERINEURAL
Status: COMPLETED
Start: 2025-05-03 | End: 2025-05-03

## 2025-05-03 RX ORDER — IBUPROFEN 800 MG/1
800 TABLET, FILM COATED ORAL 3 TIMES DAILY
Status: DISCONTINUED | OUTPATIENT
Start: 2025-05-03 | End: 2025-05-03 | Stop reason: HOSPADM

## 2025-05-03 RX ORDER — IBUPROFEN 800 MG/1
800 TABLET, FILM COATED ORAL 2 TIMES DAILY
Qty: 36 TABLET | Refills: 0 | Status: SHIPPED | OUTPATIENT
Start: 2025-05-10 | End: 2025-05-21

## 2025-05-03 RX ORDER — COLCHICINE 0.6 MG/1
0.6 TABLET ORAL DAILY
Qty: 68 TABLET | Refills: 0 | Status: SHIPPED | OUTPATIENT
Start: 2025-05-09 | End: 2025-07-06

## 2025-05-03 RX ORDER — IBUPROFEN 800 MG/1
800 TABLET, FILM COATED ORAL 2 TIMES DAILY
Status: DISCONTINUED | OUTPATIENT
Start: 2025-05-10 | End: 2025-05-03 | Stop reason: HOSPADM

## 2025-05-03 RX ORDER — IBUPROFEN 400 MG/1
400 TABLET, FILM COATED ORAL DAILY
Qty: 15 TABLET | Refills: 0 | Status: SHIPPED | OUTPATIENT
Start: 2025-05-26 | End: 2025-05-31

## 2025-05-03 RX ORDER — IBUPROFEN 800 MG/1
400 TABLET, FILM COATED ORAL DAILY
Status: DISCONTINUED | OUTPATIENT
Start: 2025-05-21 | End: 2025-05-03 | Stop reason: HOSPADM

## 2025-05-03 RX ORDER — COLCHICINE 0.6 MG/1
0.6 TABLET ORAL 2 TIMES DAILY
Qty: 10 TABLET | Refills: 0 | Status: SHIPPED | OUTPATIENT
Start: 2025-05-03 | End: 2025-05-08

## 2025-05-03 RX ORDER — OMEPRAZOLE 20 MG/1
20 CAPSULE, DELAYED RELEASE ORAL 2 TIMES DAILY
Qty: 30 CAPSULE | Refills: 0 | Status: SHIPPED | OUTPATIENT
Start: 2025-05-03

## 2025-05-03 RX ORDER — IBUPROFEN 800 MG/1
400 TABLET, FILM COATED ORAL 2 TIMES DAILY
Status: DISCONTINUED | OUTPATIENT
Start: 2025-05-20 | End: 2025-05-03 | Stop reason: HOSPADM

## 2025-05-03 RX ORDER — VERAPAMIL HYDROCHLORIDE 2.5 MG/ML
INJECTION INTRAVENOUS
Status: COMPLETED
Start: 2025-05-03 | End: 2025-05-03

## 2025-05-03 RX ORDER — HEPARIN SODIUM 200 [USP'U]/100ML
INJECTION, SOLUTION INTRAVENOUS
Status: COMPLETED
Start: 2025-05-03 | End: 2025-05-03

## 2025-05-03 RX ORDER — HEPARIN SODIUM 1000 [USP'U]/ML
INJECTION, SOLUTION INTRAVENOUS; SUBCUTANEOUS
Status: COMPLETED
Start: 2025-05-03 | End: 2025-05-03

## 2025-05-03 RX ORDER — COLCHICINE 0.6 MG/1
0.6 TABLET ORAL DAILY
Status: DISCONTINUED | OUTPATIENT
Start: 2025-05-09 | End: 2025-05-03 | Stop reason: HOSPADM

## 2025-05-03 RX ORDER — COLCHICINE 0.6 MG/1
0.6 TABLET ORAL 2 TIMES DAILY
Status: DISCONTINUED | OUTPATIENT
Start: 2025-05-03 | End: 2025-05-03 | Stop reason: HOSPADM

## 2025-05-03 RX ORDER — IBUPROFEN 400 MG/1
400 TABLET, FILM COATED ORAL 2 TIMES DAILY
Qty: 10 TABLET | Refills: 0 | Status: SHIPPED | OUTPATIENT
Start: 2025-05-20 | End: 2025-05-25

## 2025-05-03 RX ADMIN — MIDAZOLAM HYDROCHLORIDE 2 MG: 1 INJECTION, SOLUTION INTRAMUSCULAR; INTRAVENOUS at 09:27

## 2025-05-03 RX ADMIN — HEPARIN SODIUM 2000 UNITS: 200 INJECTION, SOLUTION INTRAVENOUS at 09:14

## 2025-05-03 RX ADMIN — ASPIRIN 324 MG: 81 TABLET, CHEWABLE ORAL at 09:10

## 2025-05-03 RX ADMIN — LIDOCAINE HYDROCHLORIDE: 20 INJECTION, SOLUTION INFILTRATION; PERINEURAL at 09:14

## 2025-05-03 RX ADMIN — IBUPROFEN 800 MG: 800 TABLET, FILM COATED ORAL at 14:36

## 2025-05-03 RX ADMIN — IOHEXOL 55 ML: 350 INJECTION, SOLUTION INTRAVENOUS at 09:43

## 2025-05-03 RX ADMIN — IBUPROFEN 800 MG: 800 TABLET, FILM COATED ORAL at 08:14

## 2025-05-03 RX ADMIN — SODIUM CHLORIDE 3 ML/KG/HR: 9 INJECTION, SOLUTION INTRAVENOUS at 11:01

## 2025-05-03 RX ADMIN — VERAPAMIL HYDROCHLORIDE 2.5 MG: 2.5 INJECTION, SOLUTION INTRAVENOUS at 09:14

## 2025-05-03 RX ADMIN — OMEPRAZOLE 20 MG: 20 CAPSULE, DELAYED RELEASE ORAL at 05:45

## 2025-05-03 RX ADMIN — COLCHICINE 0.6 MG: 0.6 TABLET, FILM COATED ORAL at 05:45

## 2025-05-03 RX ADMIN — HEPARIN SODIUM: 1000 INJECTION, SOLUTION INTRAVENOUS; SUBCUTANEOUS at 09:14

## 2025-05-03 RX ADMIN — FENTANYL CITRATE 100 MCG: 50 INJECTION, SOLUTION INTRAMUSCULAR; INTRAVENOUS at 09:27

## 2025-05-03 RX ADMIN — NITROGLYCERIN 10 ML: 20 INJECTION INTRAVENOUS at 09:21

## 2025-05-03 ASSESSMENT — FIBROSIS 4 INDEX: FIB4 SCORE: 1.07

## 2025-05-03 ASSESSMENT — PAIN DESCRIPTION - PAIN TYPE
TYPE: ACUTE PAIN
TYPE: ACUTE PAIN

## 2025-05-03 NOTE — DISCHARGE INSTRUCTIONS
Discharge Instructions    Discharged to home by car with friend. Discharged via walking, hospital escort: Yes.  Special equipment needed: Not Applicable    Be sure to schedule a follow-up appointment with your primary care doctor or any specialists as instructed.     Discharge Plan:   Diet Plan: Discussed  Activity Level: Discussed  Confirmed Follow up Appointment: Patient to Call and Schedule Appointment  Confirmed Symptoms Management: Discussed  Medication Reconciliation Updated: Yes    I understand that a diet low in cholesterol, fat, and sodium is recommended for good health. Unless I have been given specific instructions below for another diet, I accept this instruction as my diet prescription.   Other diet: regular     Special Instructions: None    -Is this patient being discharged with medication to prevent blood clots?  No    Is patient discharged on Warfarin / Coumadin?   No

## 2025-05-03 NOTE — PROGRESS NOTES
Bedside report received from NOC RN. Assumed care of pt. Pt awake, laying in bed. A/Ox4, VSS. No concerns, complaints or distress. Pt educated to call before getting out of bed. POC reviewed and white board updated. Tele box on. SR 62 on the monitor. Call light in reach. Bed locked in lowest position with 2 upper bed rails up. Bed alarm on.

## 2025-05-03 NOTE — PROGRESS NOTES
Cardiology Follow-up Consult Note    Date of Service:    5/3/2025      Consulting Physician: Adam Bangura M.D.    Name:   Blayne Be   YOB: 2000  Age:   24 y.o.  male   MRN:   [id number]      Assessment/Recommendations:    Acute myopericarditis.  Cath negative for obstructive CAD.  No further chest pain since 5/1.  Echo 5/2/25 with normal LVEF, Trop peak 1900s.   Colchicine and ibuprofen (taper has been placed into orders)  Patient cannot take much time off work.  Can go back next Thursday with restrictions of light walking and no lifting.   Fu with cardiology 6/3/25    Disposition:  Stable to discharge from cardiac standpoint after appropriate recovery from cath.     Patient Identifier/Subjective:  Blayne Be is a 24 y.o. male man without significant past medical history, presented to outside hospital with chest pain. Patient was transferred to [facility] for elevated troponin further management.  Being treated as myopericarditis, started on ibuprofen and colchicine.  Recurrent severe chest pain 5/2 with more elevation in trop and LBBB pattern.  Still felt probable myocarditis, echo 5/2/25 normal LVEF, but discussed with patient would proceed to cath to rule out obstruction.  Pt was agreeable, awaiting cath.     Interim History: No further chest pain.  Cath 5/3/25 normal coronaries.    Pertinent Labs/Studies:  Trop 1975, 1563, 1654    Telemetry Reviewed (personally reviewed) sinus rhythm    All other review of systems reviewed and negative.    Past medical, surgical, social, and family history reviewed and unchanged from admission except as noted in assessment and plan.    No medications prior to admission.       Inpatient Medications Reviewed    No Known Allergies      Intake/Output Summary (Last 24 hours) at 5/3/2025 0543  Last data filed at 5/2/2025 1605  Gross per 24 hour   Intake 0 ml   Output --   Net 0 ml        Physical Exam  Body mass index is 24.91 kg/m².  /62   Pulse (!) 59    Temp 37.1 °C (98.8 °F) (Temporal)   Resp 16   Wt 83.3 kg (183 lb 10.3 oz)   SpO2 96%   Vitals:    05/02/25 1915 05/02/25 2326 05/03/25 0337 05/03/25 0500   BP: 125/69 106/59 110/62    Pulse: (!) 59 63 (!) 59    Resp: 16 16 16    Temp: 36.8 °C (98.2 °F) 36.8 °C (98.2 °F) 37.1 °C (98.8 °F)    TempSrc: Temporal Temporal Temporal    SpO2: 96% 95% 96%    Weight:    83.3 kg (183 lb 10.3 oz)     Oxygen Therapy:  Pulse Oximetry: 96 %, O2 (LPM): 0, O2 Delivery Device: None - Room Air    Physical Exam  Constitutional:       Appearance: Normal appearance.   Neck:      Vascular: No JVD.   Cardiovascular:      Rate and Rhythm: Normal rate and regular rhythm.      Pulses: Normal pulses and intact distal pulses.      Heart sounds: Normal heart sounds, S1 normal and S2 normal. No murmur heard.     No friction rub. No S3 or S4 sounds.      Comments: TR band in place  Pulmonary:      Effort: No respiratory distress.      Breath sounds: No wheezing or rales.   Skin:     General: Skin is warm and dry.   Neurological:      Mental Status: He is alert.         Labs (personally reviewed and notable for):   Lab Results   Component Value Date/Time    SODIUM 139 05/03/2025 12:46 AM    POTASSIUM 4.3 05/03/2025 12:46 AM    CHLORIDE 105 05/03/2025 12:46 AM    CO2 24 05/03/2025 12:46 AM    GLUCOSE 107 (H) 05/03/2025 12:46 AM    BUN 18 05/03/2025 12:46 AM    CREATININE 0.81 05/03/2025 12:46 AM      Lab Results   Component Value Date/Time    WBC 8.3 05/03/2025 12:46 AM    RBC 4.29 (L) 05/03/2025 12:46 AM    HEMOGLOBIN 12.3 (L) 05/03/2025 12:46 AM    HEMATOCRIT 38.9 (L) 05/03/2025 12:46 AM    MCV 90.7 05/03/2025 12:46 AM    MCH 28.7 05/03/2025 12:46 AM    MCHC 31.6 (L) 05/03/2025 12:46 AM    MPV 10.0 05/03/2025 12:46 AM      Lab Results   Component Value Date/Time    CHOLSTRLTOT 114 05/02/2025 02:00 AM    LDL 65 05/02/2025 02:00 AM    HDL 26 (A) 05/02/2025 02:00 AM    TRIGLYCERIDE 115 05/02/2025 02:00 AM       Lab Results   Component Value  "Date/Time    TROPONINT 1654 (H) 05/02/2025 2028     No results found for: \"NTPROBNP\"    I personally reviewed all blood test results, EKG tracings and images of his cardiac testings.       Sofia Vera MD  Cardiologist, [facility]    Please note that this dictation was created using voice recognition software. I have made every reasonable attempt to correct obvious errors, but it is possible there are errors of grammar and possibly content that I did not discover before finalizing the note.   "

## 2025-05-03 NOTE — DISCHARGE INSTR - DIET
Heart-Healthy Nutrition Therapy    A heart-healthy diet is recommended to reduce your unhealthy blood cholesterol levels, manage high blood pressure, and lower your risk for heart disease.    To follow a heart-healthy diet,    Eat a balanced diet with whole grains, fruits and vegetables, and lean protein sources.  Achieve and maintain a healthy weight.  Choose heart-healthy unsaturated fats. Limit saturated fats, trans fats, and cholesterol intake. Eat more plant-based or vegetarian meals using beans and soy foods for protein.  Eat whole, unprocessed foods to limit the amount of sodium (salt) you eat.  Limit refined carbohydrates especially sugar, sweets and sugar-sweetened beverages.  If you drink alcohol, do so in moderation: one serving per day (women) and two servings per day (men).  One serving is equivalent to 12 ounces beer, 5 ounces wine, or 1.5 ounces distilled spirits    Tips for Choosing Heart-Healthy Fats    Choose lean protein and low-fat dairy foods to reduce saturated fat intake.    Saturated fat is usually found in animal-based protein and is associated with certain health risks. Saturated fat is the biggest contributor to raised low-density lipoprotein (LDL) cholesterol levels in the diet. Research shows that limiting saturated fat lowers unhealthy cholesterol levels. Eat no more than 5-6% of your total calories each day from saturated fat. Ask your registered dietitian nutritionist (RDN) to help you determine how much saturated fat is right for you.  There are many foods that do not contain large amounts of saturated fats. Swapping these foods to replace foods high in saturated fats will help you limit the saturated fat you eat and improve your cholesterol levels. You can also try eating more plant-based or vegetarian meals.        Avoid trans fats    Trans fats increase levels of LDL-cholesterol. Hydrogenated fat in processed foods is the main source of trans fats in foods.   Trans fats can be  found in stick margarine, shortening, processed sweets, baked goods, some fried foods, and packaged foods made with hydrogenated oils. Avoid foods with “partially hydrogenated oil” on the ingredient list such as: cookies, pastries, baked goods, biscuits, crackers, microwave popcorn, and frozen dinners.    Choose foods with heart healthy fats    Polyunsaturated and monounsaturated fat are unsaturated fats that may help lower your blood cholesterol level when used in place of saturated fat in your diet.  Ask your RDN about taking a dietary supplement with plant sterols and stanols to help lower your cholesterol level.  Research shows that substituting saturated fats with unsaturated fats is beneficial to cholesterol levels. Try these easy swaps:      Limit the amount of cholesterol you eat to less than 200 milligrams per day.    Cholesterol is a substance carried through the bloodstream via lipoproteins, which are known as “transporters” of fat. Some body functions need cholesterol to work properly, but too much cholesterol in the bloodstream can damage arteries and build up blood vessel linings (which can lead to heart attack and stroke). You should eat less than 200 milligrams cholesterol per day.  People respond differently to eating cholesterol. There is no test available right now that can figure out which people will respond more to dietary cholesterol and which will respond less. For individuals with high intake of dietary cholesterol, different types of increase (none, small, moderate, large) in LDL-cholesterol levels are all possible.    Food sources of cholesterol include egg yolks and organ meats such as liver, gizzards.  Limit egg yolks to two to four per week and avoid organ meats like liver and gizzards to control cholesterol intake.    Tips for Choosing Heart-Healthy Carbohydrates    Consume foods rich in viscous (soluble) fiber    Viscous, or soluble, fiber is found in the walls of plant cells. Viscous  fiber is found only in plant-based foods--animal-based foods like meat or dairy products do not contain fiber. In the stomach, viscous fibers absorb water and swell to form a thick, jelly-like mass. This helps to lower your unhealthy cholesterol  Rich sources of viscous fiber include asparagus, Gabbs sprouts, sweet potatoes, turnips, apricots, mangoes, oranges, legumes, barley, oats, and oat bran.  Eat at least 5 to 10 grams of viscous fiber each day. As you increase your fiber intake gradually, also increase the amount of water you drink. This will help prevent constipation.  If you have difficulty achieving this goal, ask your RDN about fiber laxatives. Choose fiber supplements made with viscous fibers such as psyllium seed husks or methylcellulose to help lower unhealthy cholesterol.    Limit refined carbohydrates    There are three types of carbohydrates: starches, sugar, and fiber. Some carbohydrates occur naturally in food, like the starches in rice or corn or the sugars in fruits and milk. Refined carbohydrates--foods with high amounts of simple sugars--can raise triglyceride levels. High triglyceride levels are associated with coronary heart disease.  Some examples of refined carbohydrate foods are table sugar, sweets, and beverages sweetened with added sugar.    Tips for Reducing Sodium (Salt)  Although sodium is important for your body to function, too much sodium can be harmful for people with high blood pressure.  As sodium and fluid buildup in your tissues and bloodstream, your blood pressure increases. High blood pressure may cause damage to other organs and increase your risk for a stroke.    Keep your salt intake to 2300 milligrams or less per day. Even if you take a pill for blood pressure or a water pill (diuretic) to remove fluid, it is still important to have less salt in your diet. Ask your RDN what amount of sodium is right for you.    Avoid processed foods.  Eat more fresh foods.  Fresh  "fruits and vegetables are naturally low in sodium, as well as frozen vegetables and fruits that have no added juices or sauces.  Fresh meats are lower in sodium than processed meats, such as menon, sausage, and hotdogs.  Read the nutrition label or ask your  to help you find a fresh meat that is low in sodium.  Eat less salt--at the table and when cooking.  A single teaspoon of table salt has 2,300 mg of sodium.  Leave the salt out of recipes for pasta, casseroles, and soups.  Ask your RDN how to cook your favorite recipes without sodium  Be a smart .  Look for food packages that say “salt-free” or “sodium-free.” These items contain less than 5 milligrams of sodium per serving.  “Very low-sodium” products contain less than 35 milligrams of sodium per serving.  “Low-sodium” products contain less than 140 milligrams of sodium per serving.   Beware of “reduced salt” or \"reduced sodium\" products.  These items may still be high in sodium. Check the nutrition label.   Add flavors to your food without adding sodium.  Try lemon juice, lime juice, fruit juice or vinegar.    Dry or fresh herbs add flavor. Try basil, bay leaf, dill, rosemary, parsley, shruthi, dry mustard, nutmeg, thyme, and paprika.  Pepper, red pepper flakes, and cayenne pepper can add spice to your meals without adding sodium. Hot sauce contains sodium, but if you use just a drop or two, it will not add up to much.  Buy a sodium-free seasoning blend or make your own at home.     Additional Lifestyle Tips    Achieve and maintain a healthy weight.  Talk with your RDN or your doctor about what is a healthy weight for you.  Set goals to reach and maintain that weight.   To lose weight, reduce your calorie intake along with increasing your physical activity. A weight loss of 10 to 15 pounds could reduce LDL-cholesterol by 5 milligrams per deciliter.  Participate in physical activity.    Talk with your health care team to find out what types of " physical activity are best for you. Set a plan to get about 30 minutes of exercise on most days.          Nutrition Counseling  Our expert team offers:   Medical Nutrition Therapy for Chronic Conditions   Weight Management   Diabetes Education and Management   Wellness Services   Body Composition Measurements   Gastrointestinal Health    Nutrition Counseling Services are located at:  [address], [place] [address]  For more information and to schedule a consultation, please call [phone].  A physician referral may be required by your insurance for coverage.

## 2025-05-03 NOTE — PROCEDURES
"CARDIAC CATHETERIZATION REPORT    REFERRING: Sofia Vera M.D.    PROCEDURE PHYSICIAN: Enzo Stevenson MD, [facility], [facility]  ASSISTANT: None    IMPRESSIONS:  1. Normal coronary arteries  2. Normal LV systolic function  3. Mild elevation in LVEDP at 19 mmHg    Recommendations:  Further recommendations per the rounding team    Pre-procedure diagnosis: Chest Pain, suspicion for ischemic heart disease  Post-procedure diagnosis: Precordial pain (not related to epicardial coronary artery disease)    Procedure performed  Selective coronary angiography  Left heart catheterization    Conscious sedation was supervised by myself and administered by trained personnel using fentanyl and versed between 917 and 944. The patient tolerated sedation without complication.     Procedure Description  1. Access: 5/6 Portuguese right radial artery Micropuncture technique was utilized following local anesthesia with lidocaine.  A radial cocktail containing verapamil and saline was administered in the radial artery sheath.        2. Diagnostic description: The catheter was passed to the central circulation with the aide of J tipped 0.35\" wire. A 5F TIG 4.0 and 6F Pigtail were used to inject the coronary circulation and enter the left ventricle during invasive hemodynamic monitoring.     3. Closing: At completion of the procedure the relevant equipment was removed from the body and hemostasis achieved by Radial band    Findings   Hemodynamics:   Aorta: 95/78 mmHg  LVEDP: 19 mmHg  No significant pullback gradient across the aortic valve    Coronary Anatomy   Left Main: Normal   LAD: Normal   LCx: Normal   RCA: Dominant, Normal     Left Ventriculography:   LVEF= 60%. Normal wall motion No mitral regurgitation Normal caliber ascending aorta    Technical Factors  1. Complications: None  2. Estimated Blood Loss: <50 cc  3. Specimens: None  4. Contrast Volume: 55 ml  5. Medications: Radial cocktail (Verapamil 2.5 mg, Nitroglycerin 100 mcg) Heparin 5000 " Units Heparin to maintain ACT >250  6. Radiation (air kerma): 89 mGy

## 2025-05-03 NOTE — DISCHARGE SUMMARY
"Discharge Summary    CHIEF COMPLAINT ON ADMISSION  No chief complaint on file.      Reason for Admission  Cardiology (NSTEMI)     Admission Date  5/1/2025    CODE STATUS  Full Code    HPI & HOSPITAL COURSE  This is a 24 y.o. male ho was transferred from outside facility on 05/1 due to chest pain, shortness of breath.     Patient reports that he attended [place] about 2 weeks ago, at that event he did participate in use of \"poppers\" and possibly cocaine.  Over the past 24 hours he has been having intermittent shortness of breath and chest pain, noted worse on exertion and with deep inspiration.     At outside facility chest x-ray unremarkable, EKG noted possible new onset left bundle branch block, troponin was 434.  Patient reports he felt some relief with Toradol however the pain has returned. His troponin also trended up . Cardiology was consulted and patient had an echo done which was negative for any acute issues. Patient also had LHC which was also clean   Per cardiology possible pericarditis and so patient started on ibuprofen taper and also colchicine   Patient now doing better, will arrange meds to bed and will discharge patient home today.  Will need to follow up with cardiology as outpatient     The patient met 2-midnight criteria for an inpatient stay at the time of discharge.    Discharge Date  5/3/2025    FOLLOW UP ITEMS POST DISCHARGE  Cardiology     DISCHARGE DIAGNOSES  Principal Problem:    Pericarditis (POA: Unknown)  Active Problems:    Polysubstance abuse (HCC) (POA: Unknown)    Myocarditis (HCC) (POA: Unknown)    Elevated troponin (POA: Unknown)  Resolved Problems:    NSTEMI (non-ST elevated myocardial infarction) (HCC) (POA: Yes)    LBBB (left bundle branch block) (POA: Unknown)      FOLLOW UP  Future Appointments   Date Time Provider Department Center   6/3/2025 12:45 PM Alesia Preciado D.N.P. [facility] None     Alesia Preciado D.N.P.  [address]  [address]  [place] " [id number]  [phone]    Go on 6/3/2025        MEDICATIONS ON DISCHARGE     Medication List        START taking these medications        Instructions   * colchicine 0.6 MG Tabs  Commonly known as: Colcrys   Take 1 Tablet by mouth 2 times a day for 5 days.  Dose: 0.6 mg     * colchicine 0.6 MG Tabs  Start taking on: May 9, 2025  Commonly known as: Colcrys   Take 1 Tablet by mouth 2 times a day for 5 days. Then take 1 Tablet by mouth every day for 58 days.  Dose: 0.6 mg     * ibuprofen 800 MG Tabs  Commonly known as: Motrin   Take 1 Tablet by mouth in the morning, at noon, and at bedtime for 6 days.  Dose: 800 mg     * ibuprofen 800 MG Tabs  Start taking on: May 10, 2025  Commonly known as: Motrin   Take 1 Tablet by mouth in the morning, at noon, and at bedtime for 6 days. Then emigdio 1 Tablet by mouth 2 times a day for 9 days.  Dose: 800 mg     * ibuprofen 400 MG Tabs  Start taking on: May 20, 2025  Commonly known as: Motrin   Take 1 Tablet by mouth 2 times a day for 5 days.  Dose: 400 mg     * ibuprofen 400 MG Tabs  Start taking on: May 26, 2025  Commonly known as: Motrin   Take 1 Tablet by mouth 2 times a day for 5 days. Then take 1 Tablet by mouth every day for 5 days. (start after finishing 800mg rx)  Dose: 400 mg     omeprazole 20 MG delayed-release capsule  Commonly known as: PriLOSEC   Take 1 Capsule by mouth 2 times a day.  Dose: 20 mg           * This list has 6 medication(s) that are the same as other medications prescribed for you. Read the directions carefully, and ask your doctor or other care provider to review them with you.                  Allergies  No Known Allergies    DIET  Orders Placed This Encounter   Procedures    Diet Order Diet: Cardiac     Standing Status:   Standing     Number of Occurrences:   1     Diet::   Cardiac [6]       ACTIVITY  As tolerated.  Weight bearing as tolerated    CONSULTATIONS  Cardiology     PROCEDURES  [facility]     LABORATORY  Lab Results   Component Value Date    SODIUM  139 05/03/2025    POTASSIUM 4.3 05/03/2025    CHLORIDE 105 05/03/2025    CO2 24 05/03/2025    GLUCOSE 107 (H) 05/03/2025    BUN 18 05/03/2025    CREATININE 0.81 05/03/2025        Lab Results   Component Value Date    WBC 8.3 05/03/2025    HEMOGLOBIN 12.3 (L) 05/03/2025    HEMATOCRIT 38.9 (L) 05/03/2025    PLATELETCT 307 05/03/2025        Total time of the discharge process exceeds 35 minutes.

## 2025-05-03 NOTE — PROGRESS NOTES
Monitor summary:        Rhythm: SB/SR  Rate: 56-81  Ectopy: (R)PVC  Measurements: .15/.08/.39        12hr chart check

## 2025-05-03 NOTE — CARE PLAN
Problem: Knowledge Deficit - Standard  Goal: Patient and family/care givers will demonstrate understanding of plan of care, disease process/condition, diagnostic tests and medications  Outcome: Progressing     Problem: Pain - Standard  Goal: Alleviation of pain or a reduction in pain to the patient’s comfort goal  Outcome: Progressing     Problem: Hemodynamics  Goal: Patient's hemodynamics, fluid balance and neurologic status will be stable or improve  Outcome: Progressing     Problem: Infection - Standard  Goal: Patient will remain free from infection  Outcome: Progressing   The patient is Stable - Low risk of patient condition declining or worsening    Shift Goals  Clinical Goals: Remain free from chest pain throughout night, rest and comfort  Patient Goals: Rest and comfort, remain free from chest pain  Family Goals: Rest and comfort for patient    Progress made toward(s) clinical / shift goals:  Patient has remained free from chest pain throughout the night.  Pt has been NPO since midnight.  Patient has been sleeping comfortably and has remained free from falls/injury    Patient is not progressing towards the following goals:N/A

## 2025-05-03 NOTE — PROGRESS NOTES
Discharge instructions reviewed with patient. Discussed follow up instructions including cardiology follow up on 6/3. Meds to beds provided. Discussed establishing with a PCP.

## 2025-05-03 NOTE — PROGRESS NOTES
[facility] with cath[facility]. Received report from cathlab RN, Pt assessed, A&Ox4, postprocedure vitals initiated, vitals stable, pt denies pain. no SOB Noted.Right radial TR band in place, 15 ml of air in band per RN report. Site clean, dry and soft. CMT WDL. Pt updated on plan of care, Call light within reach, personal belongings within reach.  Bed in lowest position.  Tele monitor on and monitor room notified, rhythm is SR 77. Will continue to monitor. Hourly rounding in place.

## 2025-05-03 NOTE — DIETARY
Nutrition Services: Diet Education Consult   Day 2 of admit.  Blayne Be is a 24 y.o. male with admitting DX of NSTEMI (non-ST elevated myocardial infarction) (HCC) [I21.4]    RD received referral for cardiac diet education. Dx list includes pericarditis, myocarditis, polysubstance abuse. S/p L heart catheterization. RD provided information via discharge instructions which includes nutrition recommendations and tips to support a heart healthy dietary pattern. This includes outpatient resources to [facility] affiliated [facility] Program for continued nutrition education and guidance as desired.     No other education needs identified at this time. Please re-consult RD for supplemental education or at the request of patient.    Please re-consult RD PRN

## 2025-05-03 NOTE — DISCHARGE PLANNING
Case Management Discharge Planning    Admission Date: 5/1/2025  GMLOS: 1.8  ALOS: 2    6-Clicks ADL Score: 24  6-Clicks Mobility Score: 24      Anticipated Discharge Dispo: Discharge Disposition: Discharged to home/self care (01)  Discharge Address: [address] [place] [address] [address] [place] [address]  Discharge Contact Phone Number: [phone]    Work excuse letter provided to pt at bedside

## 2025-05-04 LAB — HSV1+2 IGG SER IA-ACNC: 9.62 IV

## 2025-05-05 LAB
HSV1 GG IGG SER-ACNC: 17.3 IV
HSV2 GG IGG SER-ACNC: 0.18 IV

## 2025-05-06 NOTE — Clinical Note
REFERRAL APPROVAL NOTICE         Sent on May 6, 2025                   Blayne Be  [address] [place] [address]                   Dear Mr. Be,    After a careful review of the medical information and benefit coverage, [facility] has processed your referral. See below for additional details.    If applicable, you must be actively enrolled with your insurance for coverage of the authorized service. If you have any questions regarding your coverage, please contact your insurance directly.    REFERRAL INFORMATION   Referral #:  [id number]  Referred-To Department    Referred-By Provider:  Cardiac Intensive Care    Enzo Stevenson M.D.   Intensive Card Rehab      [address]  [address]  [facility] [address]  [phone] [address]  [address]  [facility] [address] [phone]    Referral Start Date:  05/03/2025  Referral End Date:   05/04/2026             SCHEDULING  If you do not already have an appointment, please call [phone] to make an appointment.     MORE INFORMATION  If you do not already have a [employer] account, sign up at: [email or web address]  You can access your medical information, make appointments, see lab results, billing information, and more.  If you have questions regarding this referral, please contact  the [facility] Referrals department at:             [phone]. Monday - Friday 8:00AM - 5:00PM.     Sincerely,    [facility]

## 2025-05-07 ENCOUNTER — OFFICE VISIT (OUTPATIENT)
Dept: MEDICAL GROUP | Facility: PHYSICIAN GROUP | Age: 25
End: 2025-05-07
Payer: COMMERCIAL

## 2025-05-07 VITALS
RESPIRATION RATE: 16 BRPM | HEART RATE: 53 BPM | TEMPERATURE: 98.2 F | BODY MASS INDEX: 25.41 KG/M2 | DIASTOLIC BLOOD PRESSURE: 84 MMHG | WEIGHT: 187.61 LBS | OXYGEN SATURATION: 98 % | HEIGHT: 72 IN | SYSTOLIC BLOOD PRESSURE: 122 MMHG

## 2025-05-07 DIAGNOSIS — F19.10 POLYSUBSTANCE ABUSE (HCC): ICD-10-CM

## 2025-05-07 DIAGNOSIS — D63.8 ANEMIA IN OTHER CHRONIC DISEASES CLASSIFIED ELSEWHERE: ICD-10-CM

## 2025-05-07 DIAGNOSIS — Z86.79 HISTORY OF MYOCARDITIS: ICD-10-CM

## 2025-05-07 DIAGNOSIS — S83.512D RUPTURE OF ANTERIOR CRUCIATE LIGAMENT OF LEFT KNEE, SUBSEQUENT ENCOUNTER: ICD-10-CM

## 2025-05-07 DIAGNOSIS — Z13.9 ENCOUNTER FOR SCREENING: ICD-10-CM

## 2025-05-07 DIAGNOSIS — Z23 ENCOUNTER FOR VACCINATION: ICD-10-CM

## 2025-05-07 DIAGNOSIS — Z86.79 HISTORY OF PERICARDITIS: ICD-10-CM

## 2025-05-07 PROBLEM — R79.89 ELEVATED TROPONIN: Status: RESOLVED | Noted: 2025-05-01 | Resolved: 2025-05-07

## 2025-05-07 PROBLEM — I31.9 PERICARDITIS: Status: RESOLVED | Noted: 2025-05-01 | Resolved: 2025-05-07

## 2025-05-07 PROBLEM — S83.512A LEFT ACL TEAR: Status: ACTIVE | Noted: 2025-05-07

## 2025-05-07 PROCEDURE — 99214 OFFICE O/P EST MOD 30 MIN: CPT | Mod: 25

## 2025-05-07 PROCEDURE — 90471 IMMUNIZATION ADMIN: CPT

## 2025-05-07 PROCEDURE — 90472 IMMUNIZATION ADMIN EACH ADD: CPT

## 2025-05-07 PROCEDURE — 90651 9VHPV VACCINE 2/3 DOSE IM: CPT | Mod: JZ

## 2025-05-07 PROCEDURE — 90715 TDAP VACCINE 7 YRS/> IM: CPT

## 2025-05-07 PROCEDURE — 3074F SYST BP LT 130 MM HG: CPT

## 2025-05-07 PROCEDURE — 3079F DIAST BP 80-89 MM HG: CPT

## 2025-05-07 RX ORDER — VALACYCLOVIR HYDROCHLORIDE 500 MG/1
500 TABLET, FILM COATED ORAL 2 TIMES DAILY
Qty: 10 TABLET | Refills: 3 | Status: SHIPPED | OUTPATIENT
Start: 2025-05-07

## 2025-05-07 ASSESSMENT — FIBROSIS 4 INDEX: FIB4 SCORE: 1.07

## 2025-05-07 NOTE — PROGRESS NOTES
Verbal consent was acquired by the patient to use [employer] ambient listening note generation during this visit     Subjective:     HPI:   History of Present Illness  The patient presents for establishment of care.    Pericarditis and Myocarditis  He was diagnosed with pericarditis and myocarditis on 05/01/2025, following an episode of severe chest pain on 04/30/2025. He sought emergency care at [facility], where elevated troponin levels were detected, prompting his transfer to our facility. During his hospitalization, he experienced one additional flare-up, which was managed with intravenous omeprazole and morphine. Since starting his current medication regimen, he has not had any further flare-ups. He has missed the last 3 days of work due to his condition and is seeking documentation for leave application. He reports no similar episodes since his discharge from the hospital. He occasionally experiences mild chest discomfort, which he attributes to stress. He is currently taking colchicine 0.6 mg twice daily for 5 days, followed by once daily for 58 days, and ibuprofen 800 mg three times daily for 6 days, then twice daily thereafter. He also takes Prilosec for heartburn.  - Onset: Diagnosed on 05/01/2025 following severe chest pain on 04/30/2025.  - Location: Chest.  - Duration: Since 04/30/2025.  - Character: Severe chest pain initially, occasional mild chest discomfort now.  - Alleviating/Aggravating Factors: Managed with intravenous omeprazole and morphine during hospitalization; current medication regimen includes colchicine, ibuprofen, and Prilosec.  - Timing: No further flare-ups since starting current medication regimen; occasional mild chest discomfort attributed to stress.  - Severity: Severe initially, mild discomfort currently.    Cold Sores  He has a history of cold sores, which he believes may be related to his heart condition. He has never taken valacyclovir or acyclovir. He currently  has an active cold sore on his lip, which has been present for a week and is improving. He has been applying a mixture of honey and Neosporin to the sore.  - Onset: Active cold sore present for a week.  - Location: Lip.  - Duration: One week.  - Character: Active cold sore.  - Alleviating/Aggravating Factors: Applying a mixture of honey and Neosporin.  - Severity: Improving.    Marijuana Use  He admits to using marijuana once or twice daily but reports no history of injectable drug use. He does not use tobacco or alcohol. He had chickenpox as a child. He does not plan on getting the COVID-19 vaccine.    Dietary Habits  He acknowledges that his diet is not well-balanced and includes red meat.    PAST SURGICAL HISTORY:  He has a history of left ACL surgery in 2014 due to a football injury. He has had multiple knee issues since then, including hyperextension injuries in 2016 and 2024, but no further surgeries.    SOCIAL HISTORY  The patient uses marijuana once or twice a day. The patient does not endorse tobacco or alcohol use.    FAMILY HISTORY  The patient's maternal grandfather passed away from heart issues and cancer in his 70s. The patient's parents are healthy with no high blood pressure, diabetes, heart attack, stroke, or other cancers. The patient's siblings are also healthy. The patient's other grandparents have no history of diabetes, heart attack, stroke, colon cancer, or prostate cancer.    Health Maintenance: Completed    Objective:     Exam:  /84   Pulse (!) 53   Temp 36.8 °C (98.2 °F) (Temporal)   Resp 16   Ht 1.829 m (6')   Wt 85.1 kg (187 lb 9.8 oz)   SpO2 98%   BMI 25.44 kg/m²  Body mass index is 25.44 kg/m².    Physical Exam  Constitutional:       Appearance: Normal appearance.   HENT:      Head: Normocephalic and atraumatic.      Right Ear: Tympanic membrane and ear canal normal. There is no impacted cerumen.      Left Ear: Tympanic membrane and ear canal normal. There is no impacted  cerumen.      Mouth/Throat:      Mouth: Mucous membranes are moist.      Pharynx: Oropharynx is clear.   Eyes:      Conjunctiva/sclera: Conjunctivae normal.      Pupils: Pupils are equal, round, and reactive to light.   Cardiovascular:      Rate and Rhythm: Normal rate and regular rhythm.      Heart sounds: No murmur heard.  Pulmonary:      Effort: Pulmonary effort is normal. No respiratory distress.      Breath sounds: Normal breath sounds. No wheezing.   Abdominal:      General: There is no distension.      Tenderness: There is no abdominal tenderness.   Musculoskeletal:         General: No swelling. Normal range of motion.      Cervical back: Normal range of motion.      Right lower leg: No edema.      Left lower leg: No edema.   Skin:     General: Skin is warm and dry.      Capillary Refill: Capillary refill takes less than 2 seconds.      Findings: No rash.   Neurological:      General: No focal deficit present.      Mental Status: He is alert and oriented to person, place, and time.      Deep Tendon Reflexes: Reflexes normal.   Psychiatric:         Mood and Affect: Mood normal.             Results  Labs   - Troponin: High, peaking at 1900 and then decreasing to 1600   - Hemoglobin: 12.3    Assessment & Plan:     1. Polysubstance abuse (HCC)        2. History of pericarditis        3. History of myocarditis  TROPONIN      4. Rupture of anterior cruciate ligament of left knee, subsequent encounter        5. Anemia in other chronic diseases classified elsewhere  Comp Metabolic Panel    CBC WITH DIFFERENTIAL      6. Encounter for vaccination  Tdap Vaccine =>6YO IM    9VHPV Vaccine 2-3 Dose (GARDASIL 9)      7. Encounter for screening  VITAMIN D,25 HYDROXY (DEFICIENCY)          Assessment & Plan  1. Pericarditis: Stable. Troponin levels were significantly elevated during hospitalization, peaking at 1900 and trending downward to 1600 by discharge.  - Continue colchicine 0.6 mg twice daily for 5 days, then once daily  for 58 days.  - Continue ibuprofen 800 mg three times daily for 6 days, then twice daily thereafter.  - Repeat blood work to monitor troponin levels and ensure continued downtrend.  - Prescription Drug Monitoring Program reviewed.    2. Cold sores: Improving.  - Prescription for valacyclovir 500 mg twice daily for 5 days provided for future flare-ups.  - Start medication at the first sign of a cold sore.    3. Anemia: Mild.  - Hemoglobin level was 12.3 g/dL during hospitalization.  - Repeat complete blood count (CBC) ordered to reassess hemoglobin levels.  - If anemia persists, further evaluation including iron studies may be necessary.    4. Health Maintenance.  - Administer tetanus vaccine today.  - Administer first dose of HPV vaccine today, with subsequent doses scheduled in 1 month and 5 months.    Follow-up  - Follow-up in 1 month to monitor progress and response to treatment.      Return in about 1 month (around 6/7/2025) for f/u labs and myocarditis.    Please note that this dictation was created using voice recognition software. I have made every reasonable attempt to correct obvious errors, but I expect that there are errors of grammar and possibly content that I did not discover before finalizing the note.

## 2025-05-08 NOTE — DOCUMENTATION QUERY
"                                                                         [facility]                                                                       Query Response Note      PATIENT:               IBRAHIMA WILCOX  ACCT #:                  [id number]  MRN:                     [id number]  :                      2000  ADMIT DATE:       2025 2:09 PM  DISCH DATE:        5/3/2025 4:06 PM  RESPONDING  PROVIDER #:        [id number]           QUERY TEXT:    Pt has documented NSTEMI noted as unlikely in the medical record. Please clarify status of this condition.    The patient's Clinical Indicators include:  23yo with dx of pericarditis, polysubstance abuse, myocarditis, elevated troponin, LBBB     Consult \"NSTEMI: Yes\" \"Unlikely acute coronary syndrome from plaque rupture...\"   PN \"EKG ...diffuse ST elevation consistent with pericarditis...\"   Discharge summary \"NSTEMI: Yes\"     troponin 706   troponin 1654     EKG ST elevation, probably normal early repol pattern, Nonspecific T abnormalities, inferior leads   EKG Left bundle branch block; Lateral ST elevation consider acute MI     Procedures: Post procedure diagnosis: Precordial pain (not related to epicardial coronary artery disease).     Risk factors: polysubstance abuse, LBBB, elevated troponin, Pericarditis, Myocarditis  Treatments: Cardiology consult, EKG, Echocardiogram, left heart cath, labwork    Note: If you agree with a diagnosis listed above, please remember to include it in your concurrent daily documentation and onto the Discharge Summary.    Contact me with questions.    Thank you,  DENISSE Myrick, CDI  [email or web address]  Options provided:   -- NSTEMI is ruled out   -- NSTEMI is ruled in   -- Other explanation, (please specify other explanation)   -- Unable to determine      Query created by: Christine Tompkins on 2025 7:31 AM    RESPONSE TEXT:    NSTEMI is ruled out          Electronically signed by:  " JOSÉ LUIS CHAN MD 5/8/2025 10:10 AM

## 2025-05-09 NOTE — DISCHARGE PLANNING
Case Management Discharge Planning    Received call from pt requesting [facility] paperwork.   [facility] paperwork submitted through online portal.

## 2025-05-29 ENCOUNTER — TELEPHONE (OUTPATIENT)
Dept: CARDIOLOGY | Facility: MEDICAL CENTER | Age: 25
End: 2025-05-29
Payer: COMMERCIAL

## 2025-06-03 ENCOUNTER — OFFICE VISIT (OUTPATIENT)
Dept: CARDIOLOGY | Facility: MEDICAL CENTER | Age: 25
End: 2025-06-03
Attending: NURSE PRACTITIONER
Payer: COMMERCIAL

## 2025-06-03 VITALS
DIASTOLIC BLOOD PRESSURE: 64 MMHG | BODY MASS INDEX: 24.92 KG/M2 | RESPIRATION RATE: 16 BRPM | SYSTOLIC BLOOD PRESSURE: 126 MMHG | HEART RATE: 88 BPM | OXYGEN SATURATION: 96 % | WEIGHT: 184 LBS | HEIGHT: 72 IN

## 2025-06-03 DIAGNOSIS — I31.9 PERICARDITIS, UNSPECIFIED CHRONICITY, UNSPECIFIED TYPE: ICD-10-CM

## 2025-06-03 PROCEDURE — 99212 OFFICE O/P EST SF 10 MIN: CPT | Performed by: NURSE PRACTITIONER

## 2025-06-03 PROCEDURE — 99204 OFFICE O/P NEW MOD 45 MIN: CPT | Performed by: NURSE PRACTITIONER

## 2025-06-03 PROCEDURE — 3074F SYST BP LT 130 MM HG: CPT | Performed by: NURSE PRACTITIONER

## 2025-06-03 PROCEDURE — 99213 OFFICE O/P EST LOW 20 MIN: CPT | Performed by: NURSE PRACTITIONER

## 2025-06-03 PROCEDURE — 3078F DIAST BP <80 MM HG: CPT | Performed by: NURSE PRACTITIONER

## 2025-06-03 RX ORDER — COLCHICINE 0.6 MG/1
0.6 TABLET ORAL 2 TIMES DAILY
Qty: 120 TABLET | Refills: 0 | Status: SHIPPED | OUTPATIENT
Start: 2025-06-03

## 2025-06-03 ASSESSMENT — ENCOUNTER SYMPTOMS
DYSPNEA ON EXERTION: 0
PALPITATIONS: 0
SHORTNESS OF BREATH: 0

## 2025-06-03 ASSESSMENT — FIBROSIS 4 INDEX: FIB4 SCORE: 1.07

## 2025-06-03 NOTE — PROGRESS NOTES
"Chief Complaint   Patient presents with    Hospital Follow-up     NP H F/V Dx: History of myocarditis      Subjective:   Blayne Be is a 24 y.o. male who presents today for hospital follow-up. He does not have significant medical history. He presented to outside hospital with chest pain. Patient was transferred to [facility] for elevated troponin further management.    Initially seen in the hospital by Dr. Vera.      He felt like his normal self April 21st, then he started noticing fevers, body aches, and congestion. At work the following day, he used his vape pen, noticed pain with breathing and then started having intermittent chest pain.  He went to [facility] 4/20.  He did do cocaine there, but none since and he normally does not do any drugs. He has only used cocaine about 6 x in his lifetime. Trop 706, 732, 714, 1975.     Throughout hospital stay, he was noted to be in junctional rhythm with diffuse ST elevation consistent with viral myopericarditis. Cath negative for obstructive CAD. No recurrence of chest pain. Echo 5/2/25 with normal LVEF. Discharged with colchicine and ibuprofen taper.    Today: Feels good from a cardiac standpoint. Does state that he went to [facility] in [place] after [facility]/hospital stay. Reports that he did not do drugs and remained sober and hydrated. He is \"taking it easy\" and feels like things are improving. 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    The patient denies any chest pain, pressure, tightness or discomfort; no palpitations; breathing is stable with no orthopnea or PND; no dizziness or syncope; no LE edema.     Cardiovascular Risk Factors:  1. Smoking status: Never  2. Type II Diabetes Mellitus: No   Lab Results   Component Value Date/Time    HBA1C 4.9 05/02/2025 02:00 AM     3. Hypertension: No  4. Dyslipidemia: No   Cholesterol,Tot   Date Value Ref Range Status   05/02/2025 114 100 - 199 mg/dL Final     LDL   Date Value Ref Range Status "   05/02/2025 65 <100 mg/dL Final     HDL   Date Value Ref Range Status   05/02/2025 26 (A) >=40 mg/dL Final     Triglycerides   Date Value Ref Range Status   05/02/2025 115 0 - 149 mg/dL Final     Past Medical History[1]    Family History   Problem Relation Age of Onset    Heart Disease Maternal Grandfather     Cancer Maternal Grandfather 70 - 79     Social History[2]    Allergies[3]    Current Outpatient Medications   Medication Sig    colchicine (COLCRYS) 0.6 MG Tab Take 1 Tablet by mouth 2 times a day.    valACYclovir (VALTREX) 500 MG Tab Take 1 Tablet by mouth 2 times a day.    omeprazole (PRILOSEC) 20 MG delayed-release capsule Take 1 Capsule by mouth 2 times a day.     Review of Systems   Constitutional: Negative for malaise/fatigue.   Cardiovascular:  Negative for chest pain, dyspnea on exertion, leg swelling and palpitations.   Respiratory:  Negative for shortness of breath.    Musculoskeletal:  Negative for muscle weakness.       Objective:   /64 (BP Location: Left arm, Patient Position: Sitting, BP Cuff Size: Adult)   Pulse 88   Resp 16   Ht 1.829 m (6')   Wt 83.5 kg (184 lb)   SpO2 96%  Body mass index is 24.95 kg/m².    Physical Exam  Constitutional:       Appearance: Normal appearance.   Cardiovascular:      Rate and Rhythm: Normal rate and regular rhythm.      Pulses: Normal pulses.      Heart sounds: Normal heart sounds. No murmur heard.     No friction rub. No gallop.   Pulmonary:      Effort: Pulmonary effort is normal. No respiratory distress.      Breath sounds: Normal breath sounds. No wheezing or rales.   Abdominal:      Palpations: Abdomen is soft.   Musculoskeletal:      Right lower leg: No edema.      Left lower leg: No edema.   Skin:     General: Skin is warm and dry.      Capillary Refill: Capillary refill takes less than 2 seconds.   Neurological:      General: No focal deficit present.      Mental Status: He is alert and oriented to person, place, and time.   Psychiatric:          Mood and Affect: Mood normal.         Behavior: Behavior normal.        Lab Results   Component Value Date/Time    SODIUM 139 05/03/2025 12:46 AM    POTASSIUM 4.3 05/03/2025 12:46 AM    CHLORIDE 105 05/03/2025 12:46 AM    CO2 24 05/03/2025 12:46 AM    GLUCOSE 107 (H) 05/03/2025 12:46 AM    BUN 18 05/03/2025 12:46 AM    CREATININE 0.81 05/03/2025 12:46 AM      Lab Results   Component Value Date/Time    WBC 8.3 05/03/2025 12:46 AM    RBC 4.29 (L) 05/03/2025 12:46 AM    HEMOGLOBIN 12.3 (L) 05/03/2025 12:46 AM    HEMATOCRIT 38.9 (L) 05/03/2025 12:46 AM    MCV 90.7 05/03/2025 12:46 AM    MCH 28.7 05/03/2025 12:46 AM    MCHC 31.6 (L) 05/03/2025 12:46 AM    MPV 10.0 05/03/2025 12:46 AM      Lab Results   Component Value Date/Time    CHOLSTRLTOT 114 05/02/2025 02:00 AM    LDL 65 05/02/2025 02:00 AM    HDL 26 (A) 05/02/2025 02:00 AM    TRIGLYCERIDE 115 05/02/2025 02:00 AM       Lab Results   Component Value Date/Time    TROPONINT 1654 (H) 05/02/2025 2028     Assessment:   1. Pericarditis, unspecified chronicity, unspecified type  - colchicine (COLCRYS) 0.6 MG Tab; Take 1 Tablet by mouth 2 times a day.  Dispense: 120 Tablet; Refill: 0     Medical Decision Making:  Today's Assessment / Plan:     Pericarditis  -Chest pain with trop 706 here likely viral myopericarditis.    -Elevated hsCRP.  Pain improved with Toradol while hospitalized.    -Unlikely acute coronary syndrome from plaque rupture in a 24 year old without CAD risk factors.  -Clean cath, unremarkable echocardiogram. Inconsistent ST changes on EKG   -Finished ibuprofen taper  -Has 2 months left of colchicine, RX refilled  -Instructed to stay hydrated and refrain from drug use    No follow-ups on file. Can come see me if a new issue arises. Instructed he can reach out via MyChart with questions.    Alesia Preciado D.N.P.          [1]   Past Medical History:  Diagnosis Date    Elevated troponin 05/01/2025    Pericarditis 05/01/2025    Polysubstance abuse (HCC)  05/01/2025    Used cocaine 1 time on 4/2025  Uses marijuana 1-2 times a day     [2]   Social History  Tobacco Use    Smoking status: Never    Smokeless tobacco: Never   Vaping Use    Vaping status: Former    Substances: Nicotine   Substance Use Topics    Alcohol use: No    Drug use: Yes     Types: Marijuana, Inhaled   [3] No Known Allergies

## 2025-06-25 ENCOUNTER — PATIENT MESSAGE (OUTPATIENT)
Dept: CARDIOLOGY | Facility: MEDICAL CENTER | Age: 25
End: 2025-06-25
Payer: COMMERCIAL

## 2025-06-25 NOTE — PATIENT COMMUNICATION
Call placed to patient to further discuss symptoms. Patient reports he has been having a dull/sharp pain located to the left/center area of his chest that feels as if he is being punched from the inside. He feels this pain every 10-15 minutes that comes along with the heart beat. Denies any other symptoms. Has not used heat or ice to relieve pain. Since completing tapering dose of ibuprofen has not used any additional ibuprofen to relieve pain. 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    LB- can you please review and advise if appropriate to write letter to allow for patient to be on light duty? Thank you

## 2025-06-25 NOTE — LETTER
June 25, 2025              Blayne Chris Be is currently being cared for at [facility]. Blayne Be should not lift over 20 pounds. Blayne Be should not participate in excessive bending, pushing and pulling greater than 20 pounds. Please allow for restrictions to remain in place until August 1, 2025.          Thank you,          Alesia Preciado D.N.P.    Electronically Signed

## 2025-07-03 ENCOUNTER — PATIENT MESSAGE (OUTPATIENT)
Dept: CARDIOLOGY | Facility: MEDICAL CENTER | Age: 25
End: 2025-07-03
Payer: COMMERCIAL

## 2025-07-08 NOTE — PATIENT COMMUNICATION
Noted pt MyChart message   ======================   Paperwork in progress.   ==========================  MyChart message to pt.

## 2025-07-10 NOTE — PATIENT COMMUNICATION
Noted pt MyChart response.    Paperwork emailed to pt at [email or web address].    Paperwork scanned in to [facility]